# Patient Record
Sex: FEMALE | Race: WHITE | NOT HISPANIC OR LATINO | Employment: UNEMPLOYED | URBAN - NONMETROPOLITAN AREA
[De-identification: names, ages, dates, MRNs, and addresses within clinical notes are randomized per-mention and may not be internally consistent; named-entity substitution may affect disease eponyms.]

---

## 2017-01-06 ENCOUNTER — TRANSCRIBE ORDERS (OUTPATIENT)
Dept: ADMINISTRATIVE | Facility: HOSPITAL | Age: 42
End: 2017-01-06

## 2017-01-06 DIAGNOSIS — M25.571 CHRONIC PAIN OF RIGHT ANKLE: Primary | ICD-10-CM

## 2017-01-06 DIAGNOSIS — G89.29 CHRONIC PAIN OF RIGHT ANKLE: Primary | ICD-10-CM

## 2017-01-12 ENCOUNTER — HOSPITAL ENCOUNTER (OUTPATIENT)
Dept: CT IMAGING | Facility: HOSPITAL | Age: 42
Discharge: HOME | End: 2017-01-12
Attending: PODIATRIST | Admitting: PODIATRIST

## 2017-01-12 DIAGNOSIS — G89.29 CHRONIC PAIN OF RIGHT ANKLE: ICD-10-CM

## 2017-01-12 DIAGNOSIS — M25.571 CHRONIC PAIN OF RIGHT ANKLE: ICD-10-CM

## 2017-01-12 PROCEDURE — 73700 CT LOWER EXTREMITY W/O DYE: CPT

## 2017-01-25 ENCOUNTER — APPOINTMENT (OUTPATIENT)
Dept: PREADMISSION TESTING | Facility: HOSPITAL | Age: 42
End: 2017-01-25

## 2017-01-25 VITALS
SYSTOLIC BLOOD PRESSURE: 127 MMHG | WEIGHT: 262.25 LBS | HEART RATE: 85 BPM | HEIGHT: 69 IN | BODY MASS INDEX: 38.84 KG/M2 | DIASTOLIC BLOOD PRESSURE: 82 MMHG | OXYGEN SATURATION: 97 %

## 2017-01-25 LAB
25(OH)D3 SERPL-MCNC: 22 NG/ML (ref 30–100)
CALCIUM SPEC-SCNC: 9.4 MG/DL (ref 8.4–10.4)
DEPRECATED RDW RBC AUTO: 47.7 FL (ref 40–54)
ERYTHROCYTE [DISTWIDTH] IN BLOOD BY AUTOMATED COUNT: 14.8 % (ref 12–15)
HCT VFR BLD AUTO: 39.2 % (ref 37–47)
HGB BLD-MCNC: 13 G/DL (ref 12–16)
MCH RBC QN AUTO: 29.5 PG (ref 28–32)
MCHC RBC AUTO-ENTMCNC: 33.2 G/DL (ref 33–36)
MCV RBC AUTO: 88.9 FL (ref 82–98)
PLATELET # BLD AUTO: 308 10*3/MM3 (ref 130–400)
PMV BLD AUTO: 10.7 FL (ref 6–12)
RBC # BLD AUTO: 4.41 10*6/MM3 (ref 4.2–5.4)
WBC NRBC COR # BLD: 8.83 10*3/MM3 (ref 4.8–10.8)

## 2017-01-25 PROCEDURE — 85027 COMPLETE CBC AUTOMATED: CPT | Performed by: PODIATRIST

## 2017-01-25 PROCEDURE — 36415 COLL VENOUS BLD VENIPUNCTURE: CPT

## 2017-01-25 PROCEDURE — 82310 ASSAY OF CALCIUM: CPT | Performed by: PODIATRIST

## 2017-01-25 PROCEDURE — 82306 VITAMIN D 25 HYDROXY: CPT | Performed by: PODIATRIST

## 2017-01-25 RX ORDER — ROPINIROLE 2 MG/1
2 TABLET, FILM COATED ORAL DAILY PRN
COMMUNITY

## 2017-01-25 NOTE — MR AVS SNAPSHOT
"                        Vikash Sargent   2017 10:00 AM   Appointment    Provider:  [facility] 34 RM 4   Department:  [facility] PREADMISSION T   Dept Phone:  [phone]                Your Full Care Plan              Your Updated Medication List          This list is accurate as of: 17 10:46 AM.  Always use your most recent med list.                rOPINIRole 2 MG tablet   Commonly known as:  REQUIP               IntegenX Signup     [facility] IntegenX allows you to send messages to your doctor, view your test results, renew your prescriptions, schedule appointments, and more. To sign up, go to [employer]FO and click on the Sign Up Now link in the New User? box. Enter your Inte[employer] Activation Code exactly as it appears below along with the last four digits of your Social Security Number and your Date of Birth () to complete the sign-up process. If you do not sign up before the expiration date, you must request a new code.    IntegenX Activation Code: [email or web address]  Expires: 2017 10:46 AM    If you have questions, you can email [email or web address] or call [phone] to talk to our Integen[employer] staff. Remember, IntegenX is NOT to be used for urgent needs. For medical emergencies, dial 911.               Other Info from Your Visit           Allergies     Fish-derived Products Shortness Of Breath      Vital Signs     Blood Pressure Pulse Height Weight Oxygen Saturation Body Mass Index    127/82 85 69\" (175.3 cm) 262 lb 4 oz (119 kg) 97% 38.73 kg/m2    Smoking Status                   Current Every Day Smoker             Discharge Instructions         DAY OF SURGERY INSTRUCTIONS        YOUR SURGEON: ***Dr. Wang    PROCEDURE: ***ankle and subtalor joint arthrodesis, right ankle    DATE OF SURGERY: ***17    ARRIVAL TIME: AS DIRECTED BY OFFICE    DAY OF SURGERY TAKE ONLY THESE MEDICATIONS: ***none        BEFORE YOU COME TO THE HOSPITAL  (Pre-op instructions)  • Do " not eat, drink, smoke or chew gum after midnight the night before surgery.  This also includes no mints.  • Morning of surgery take only the medicines you have been instructed with a sip of water unless otherwise instructed  by your physician.  • Do not shave, wear makeup or dark nail polish.  • Remove all jewelry including rings.  • Leave anything you consider valuable at home.  • Leave your suitcase in the car until after your surgery.  • Bring the following with you if applicable:  o Picture ID and insurance, Medicare or Medicaid cards  o Co-pay/deductible required by insurance (cash, check, credit card)  o Medications (no narcotics) in original bottles (not a list) including all over-the-counter medications.  o Copy of advance directive, living will or power-of- documents if not brought to PAT  o CPAP or BIPAP mask and tubing  o Skin prep instruction sheet  o Relaxation aids (MP3 player, book, magazine)  • Confirm your arrival time with you surgeon the day before your surgery (surgery times are subject to change)  • On the day of surgery check in at registration located at the main entrance of the hospital.       Outpatient Surgery Guidelines, Adult  Outpatient procedures are those for which the person having the procedure is allowed to go home the same day as the procedure. Various procedures are done on an outpatient basis. You should follow some general guidelines if you will be having an outpatient procedure.  LET YOUR HEALTH CARE PROVIDER KNOW ABOUT:  · Any allergies you have.  · All medicines you are taking, including vitamins, herbs, eye drops, creams, and over-the-counter medicines.  · Previous problems you or members of your family have had with the use of anesthetics.  · Any blood disorders you have.  · Previous surgeries you have had.  · Medical conditions you have.  RISKS AND COMPLICATIONS  Your health care provider will discuss possible risks and complications with you before surgery. Common  risks and complications include:    · Problems due to the use of anesthetics.  · Blood loss and replacement (does not apply to minor surgical procedures).  · Temporary increase in pain due to surgery.  · Uncorrected pain or problems that the surgery was meant to correct.  · Infection.  · New damage.  BEFORE THE PROCEDURE  · Ask your health care provider about changing or stopping your regular medicines. You may need to stop taking certain medicines in the days or weeks before the procedure.  · Stop smoking at least 2 weeks before surgery. This lowers your risk for complications during and after surgery. Ask your health care provider for help with this if needed.  · Eat your usual meals and a light supper the day before surgery. Continue fluid intake. Do not drink alcohol.  · Do not eat or drink after midnight the night before your surgery.   · Arrange for someone to take you home and to stay with you for 24 hours after the procedure. Medicine given for your procedure may affect your ability to drive or to care for yourself.  · Call your health care provider's office if you develop an illness or problem that may prevent you from safely having your procedure.  AFTER THE PROCEDURE  After surgery, you will be taken to a recovery area, where your progress will be monitored. If there are no complications, you will be allowed to go home when you are awake, stable, and taking fluids well. You may have numbness around the surgical site. Healing will take some time. You will have tenderness at the surgical site and may have some swelling and bruising. You may also have some nausea.  HOME CARE INSTRUCTIONS  · Do not drive for 24 hours, or as directed by your health care provider. Do not drive while taking prescription pain medicines.  · Do not drink alcohol for 24 hours.  · Do not make important decisions or sign legal documents for 24 hours.  · You may resume a normal diet and activities as directed.  · Do not lift anything  heavier than 10 pounds (4.5 kg) or play contact sports until your health care provider says it is okay.  · Change your bandages (dressings) as directed.  · Only take over-the-counter or prescription medicines as directed by your health care provider.  · Follow up with your health care provider as directed.  SEEK MEDICAL CARE IF:  · You have increased bleeding (more than a small spot) from the surgical site.  · You have redness, swelling, or increasing pain in the wound.  · You see pus coming from the wound.  · You have a fever.  · You notice a bad smell coming from the wound or dressing.  · You feel lightheaded or faint.  · You develop a rash.  · You have trouble breathing.  · You develop allergies.  MAKE SURE YOU:  · Understand these instructions.  · Will watch your condition.  · Will get help right away if you are not doing well or get worse.     This information is not intended to replace advice given to you by your health care provider. Make sure you discuss any questions you have with your health care provider.     Document Released: 09/12/2002 Document Revised: 05/03/2016 Document Reviewed: 05/22/2014  [employer] Interactive Patient Education ©2016 [employer]       Fall Prevention in Hospitals, Adult  As a hospital patient, your condition and the treatments you receive can increase your risk for falls. Some additional risk factors for falls in a hospital include:  · Being in an unfamiliar environment.  · Being on bed rest.  · Your surgery.  · Taking certain medicines.  · Your tubing requirements, such as intravenous (IV) therapy or catheters.  It is important that you learn how to decrease fall risks while at the hospital. Below are important tips that can help prevent falls.  SAFETY TIPS FOR PREVENTING FALLS  Talk about your risk of falling.  · Ask your health care provider why you are at risk for falling. Is it your medicine, illness, tubing placement, or something else?  · Make a plan with your health care  provider to keep you safe from falls.  · Ask your health care provider or pharmacist about side effects of your medicines. Some medicines can make you dizzy or affect your coordination.  Ask for help.  · Ask for help before getting out of bed. You may need to press your call button.  · Ask for assistance in getting safely to the toilet.  · Ask for a walker or cane to be put at your bedside. Ask that most of the side rails on your bed be placed up before your health care provider leaves the room.  · Ask family or friends to sit with you.  · Ask for things that are out of your reach, such as your glasses, hearing aids, telephone, bedside table, or call button.  Follow these tips to avoid falling:  · Stay lying or seated, rather than standing, while waiting for help.  · Wear rubber-soled slippers or shoes whenever you walk in the hospital.  · Avoid quick, sudden movements.  ¨ Change positions slowly.  ¨ Sit on the side of your bed before standing.  ¨ Stand up slowly and wait before you start to walk.  · Let your health care provider know if there is a spill on the floor.  · Pay careful attention to the medical equipment, electrical cords, and tubes around you.  · When you need help, use your call button by your bed or in the bathroom. Wait for one of your health care providers to help you.  · If you feel dizzy or unsure of your footing, return to bed and wait for assistance.  · Avoid being distracted by the TV, telephone, or another person in your room.  · Do not lean or support yourself on rolling objects, such as IV poles or bedside tables.     This information is not intended to replace advice given to you by your health care provider. Make sure you discuss any questions you have with your health care provider.     Document Released: 12/15/2001 Document Revised: 01/08/2016 Document Reviewed: 08/25/2013  [employer] Interactive Patient Education ©2016 [employer].       Surgical Site Infections FAQs  What is a Surgical  Site Infection (SSI)?  A surgical site infection is an infection that occurs after surgery in the part of the body where the surgery took place. Most patients who have surgery do not develop an infection. However, infections develop in about 1 to 3 out of every 100 patients who have surgery.  Some of the common symptoms of a surgical site infection are:  · Redness and pain around the area where you had surgery  · Drainage of cloudy fluid from your surgical wound  · Fever  Can SSIs be treated?  Yes. Most surgical site infections can be treated with antibiotics. The antibiotic given to you depends on the bacteria (germs) causing the infection. Sometimes patients with SSIs also need another surgery to treat the infection.  What are some of the things that hospitals are doing to prevent SSIs?  To prevent SSIs, doctors, nurses, and other healthcare providers:  · Clean their hands and arms up to their elbows with an antiseptic agent just before the surgery.  · Clean their hands with soap and water or an alcohol-based hand rub before and after caring for each patient.  · May remove some of your hair immediately before your surgery using electric clippers if the hair is in the same area where the procedure will occur. They should not shave you with a razor.  · Wear special hair covers, masks, gowns, and gloves during surgery to keep the surgery area clean.  · Give you antibiotics before your surgery starts. In most cases, you should get antibiotics within 60 minutes before the surgery starts and the antibiotics should be stopped within 24 hours after surgery.  · Clean the skin at the site of your surgery with a special soap that kills germs.  What can I do to help prevent SSIs?  Before your surgery:  · Tell your doctor about other medical problems you may have. Health problems such as allergies, diabetes, and obesity could affect your surgery and your treatment.  · Quit smoking. Patients who smoke get more infections. Talk  to your doctor about how you can quit before your surgery.  · Do not shave near where you will have surgery. Shaving with a razor can irritate your skin and make it easier to develop an infection.  At the time of your surgery:  · Speak up if someone tries to shave you with a razor before surgery. Ask why you need to be shaved and talk with your surgeon if you have any concerns.  · Ask if you will get antibiotics before surgery.  After your surgery:  · Make sure that your healthcare providers clean their hands before examining you, either with soap and water or an alcohol-based hand rub.  · If you do not see your providers clean their hands, please ask them to do so.  · Family and friends who visit you should not touch the surgical wound or dressings.  · Family and friends should clean their hands with soap and water or an alcohol-based hand rub before and after visiting you. If you do not see them clean their hands, ask them to clean their hands.  What do I need to do when I go home from the hospital?  · Before you go home, your doctor or nurse should explain everything you need to know about taking care of your wound. Make sure you understand how to care for your wound before you leave the hospital.  · Always clean your hands before and after caring for your wound.  · Before you go home, make sure you know who to contact if you have questions or problems after you get home.  · If you have any symptoms of an infection, such as redness and pain at the surgery site, drainage, or fever, call your doctor immediately.  If you have additional questions, please ask your doctor or nurse.  Developed and co-sponsored by The Society for Healthcare Epidemiology of [place] (SHEA); Infectious Diseases Society of [place] (IDSA); American Hospital Association; Association for Professionals in Infection Control and Epidemiology (APIC); Centers for Disease Control and Prevention (CDC); and The Joint Commission.     This information  is not intended to replace advice given to you by your health care provider. Make sure you discuss any questions you have with your health care provider.     Document Released: 12/23/2014 Document Revised: 01/08/2016 Document Reviewed: 03/02/2016  [employer] Interactive Patient Education ©2016 [employer] Inc.       [facility]  CHG 4% Patient Instruction Sheet    Preparing the Skin Before Surgery  Preparing or “prepping” skin before surgery can reduce the risk of infection at the surgical site. To make the process easier,[facility] has chosen 4% Chlorhexidine Gluconate (CHG) antiseptic solution.   The steps below outline the prepping process and should be carefully followed.                                                                                                                                                      Prep the skin at the following time(s):                                                      We recommend you shower the night before surgery, and again the morning of surgery with the 4% CHG antiseptic solution using half of the bottle and a cloth each time.  Dress in clean clothes/sleepwear after showering.  See instructions below for application.          Do not apply any lotions or moisturizers.       Do not shave the area to be prepped for at least 2 days prior to surgery.    Clipping the hair may be done immediately prior to your surgery at the hospital    if needed.    Directions:  Thoroughly rinse your body with water.  Apply 4% CHG to a cloth and wash skin gently, paying special attention to the operative site.  Rinse again thoroughly.  Once you have begun using this product do not apply anything else to your skin. If itching or redness persists, rinse affected areas and discontinue use.    When using this product:  • Keep out of eyes, ears, and mouth.  • If solution should contact these areas, rinse out promptly and thoroughly with water.  • For external use only.  • Do not use in genital  area, on your face or head.      PATIENT/FAMILY/RESPONSIBLE PARTY VERBALIZES UNDERSTANDING OF ABOVE EDUCATION       SYMPTOMS OF A STROKE    Call 911 or have someone take you to the Emergency Department if you have any of the following:    · Sudden numbness or weakness of your face, arm or leg especially on one side of the body  · Sudden confusion, diffiiculty speaking or trouble understanding   · Changes in your vision or loss of sight in one eye  · Sudden severe headache with no known cause  · sudden dizziness, trouble walking, loss of balance or coordination    It is important to seek emergency care right away if you have further stroke symptoms. If you get emergency help quickly, the powerful clot-dissolving medicines can reduce the disabilities caused by a stroke.     For more information:    American Stroke Association  4-873-6-STROKE  www.strokeassociation.org           IF YOU SMOKE OR USE TOBACCO PLEASE READ THE FOLLOWING:    Why is smoking bad for me?  Smoking increases the risk of heart disease, lung disease, vascular disease, stroke, and cancer.     If you smoke, STOP!    If you would like more information on quitting smoking, please visit the [employer] website: www.clinovo/Purplleate/healthier-together/smoke   This link will provide additional resources including the QUIT line and the [employer] support groups.     For more information:    American Cancer Society  ([phone] 852-2967    American Heart Association  1-313.405.2836

## 2017-01-25 NOTE — DISCHARGE INSTRUCTIONS
DAY OF SURGERY INSTRUCTIONS        YOUR SURGEON: ***Dr. Wang    PROCEDURE: ***ankle and subtalor joint arthrodesis, right ankle    DATE OF SURGERY: ***1/31/17    ARRIVAL TIME: AS DIRECTED BY OFFICE    DAY OF SURGERY TAKE ONLY THESE MEDICATIONS: ***none        BEFORE YOU COME TO THE HOSPITAL  (Pre-op instructions)  • Do not eat, drink, smoke or chew gum after midnight the night before surgery.  This also includes no mints.  • Morning of surgery take only the medicines you have been instructed with a sip of water unless otherwise instructed  by your physician.  • Do not shave, wear makeup or dark nail polish.  • Remove all jewelry including rings.  • Leave anything you consider valuable at home.  • Leave your suitcase in the car until after your surgery.  • Bring the following with you if applicable:  o Picture ID and insurance, Medicare or Medicaid cards  o Co-pay/deductible required by insurance (cash, check, credit card)  o Medications (no narcotics) in original bottles (not a list) including all over-the-counter medications.  o Copy of advance directive, living will or power-of- documents if not brought to PAT  o CPAP or BIPAP mask and tubing  o Skin prep instruction sheet  o Relaxation aids (MP3 player, book, magazine)  • Confirm your arrival time with you surgeon the day before your surgery (surgery times are subject to change)  • On the day of surgery check in at registration located at the main entrance of the hospital.       Outpatient Surgery Guidelines, Adult  Outpatient procedures are those for which the person having the procedure is allowed to go home the same day as the procedure. Various procedures are done on an outpatient basis. You should follow some general guidelines if you will be having an outpatient procedure.  LET YOUR HEALTH CARE PROVIDER KNOW ABOUT:  · Any allergies you have.  · All medicines you are taking, including vitamins, herbs, eye drops, creams, and over-the-counter  medicines.  · Previous problems you or members of your family have had with the use of anesthetics.  · Any blood disorders you have.  · Previous surgeries you have had.  · Medical conditions you have.  RISKS AND COMPLICATIONS  Your health care provider will discuss possible risks and complications with you before surgery. Common risks and complications include:    · Problems due to the use of anesthetics.  · Blood loss and replacement (does not apply to minor surgical procedures).  · Temporary increase in pain due to surgery.  · Uncorrected pain or problems that the surgery was meant to correct.  · Infection.  · New damage.  BEFORE THE PROCEDURE  · Ask your health care provider about changing or stopping your regular medicines. You may need to stop taking certain medicines in the days or weeks before the procedure.  · Stop smoking at least 2 weeks before surgery. This lowers your risk for complications during and after surgery. Ask your health care provider for help with this if needed.  · Eat your usual meals and a light supper the day before surgery. Continue fluid intake. Do not drink alcohol.  · Do not eat or drink after midnight the night before your surgery.   · Arrange for someone to take you home and to stay with you for 24 hours after the procedure. Medicine given for your procedure may affect your ability to drive or to care for yourself.  · Call your health care provider's office if you develop an illness or problem that may prevent you from safely having your procedure.  AFTER THE PROCEDURE  After surgery, you will be taken to a recovery area, where your progress will be monitored. If there are no complications, you will be allowed to go home when you are awake, stable, and taking fluids well. You may have numbness around the surgical site. Healing will take some time. You will have tenderness at the surgical site and may have some swelling and bruising. You may also have some nausea.  HOME CARE  INSTRUCTIONS  · Do not drive for 24 hours, or as directed by your health care provider. Do not drive while taking prescription pain medicines.  · Do not drink alcohol for 24 hours.  · Do not make important decisions or sign legal documents for 24 hours.  · You may resume a normal diet and activities as directed.  · Do not lift anything heavier than 10 pounds (4.5 kg) or play contact sports until your health care provider says it is okay.  · Change your bandages (dressings) as directed.  · Only take over-the-counter or prescription medicines as directed by your health care provider.  · Follow up with your health care provider as directed.  SEEK MEDICAL CARE IF:  · You have increased bleeding (more than a small spot) from the surgical site.  · You have redness, swelling, or increasing pain in the wound.  · You see pus coming from the wound.  · You have a fever.  · You notice a bad smell coming from the wound or dressing.  · You feel lightheaded or faint.  · You develop a rash.  · You have trouble breathing.  · You develop allergies.  MAKE SURE YOU:  · Understand these instructions.  · Will watch your condition.  · Will get help right away if you are not doing well or get worse.     This information is not intended to replace advice given to you by your health care provider. Make sure you discuss any questions you have with your health care provider.     Document Released: 09/12/2002 Document Revised: 05/03/2016 Document Reviewed: 05/22/2014  [employer] Interactive Patient Education ©2016 [employer]       Fall Prevention in Hospitals, Adult  As a hospital patient, your condition and the treatments you receive can increase your risk for falls. Some additional risk factors for falls in a hospital include:  · Being in an unfamiliar environment.  · Being on bed rest.  · Your surgery.  · Taking certain medicines.  · Your tubing requirements, such as intravenous (IV) therapy or catheters.  It is important that you learn how  to decrease fall risks while at the hospital. Below are important tips that can help prevent falls.  SAFETY TIPS FOR PREVENTING FALLS  Talk about your risk of falling.  · Ask your health care provider why you are at risk for falling. Is it your medicine, illness, tubing placement, or something else?  · Make a plan with your health care provider to keep you safe from falls.  · Ask your health care provider or pharmacist about side effects of your medicines. Some medicines can make you dizzy or affect your coordination.  Ask for help.  · Ask for help before getting out of bed. You may need to press your call button.  · Ask for assistance in getting safely to the toilet.  · Ask for a walker or cane to be put at your bedside. Ask that most of the side rails on your bed be placed up before your health care provider leaves the room.  · Ask family or friends to sit with you.  · Ask for things that are out of your reach, such as your glasses, hearing aids, telephone, bedside table, or call button.  Follow these tips to avoid falling:  · Stay lying or seated, rather than standing, while waiting for help.  · Wear rubber-soled slippers or shoes whenever you walk in the hospital.  · Avoid quick, sudden movements.  ¨ Change positions slowly.  ¨ Sit on the side of your bed before standing.  ¨ Stand up slowly and wait before you start to walk.  · Let your health care provider know if there is a spill on the floor.  · Pay careful attention to the medical equipment, electrical cords, and tubes around you.  · When you need help, use your call button by your bed or in the bathroom. Wait for one of your health care providers to help you.  · If you feel dizzy or unsure of your footing, return to bed and wait for assistance.  · Avoid being distracted by the TV, telephone, or another person in your room.  · Do not lean or support yourself on rolling objects, such as IV poles or bedside tables.     This information is not intended to  replace advice given to you by your health care provider. Make sure you discuss any questions you have with your health care provider.     Document Released: 12/15/2001 Document Revised: 01/08/2016 Document Reviewed: 08/25/2013  [employer] Interactive Patient Education ©2016 [employer]       Surgical Site Infections FAQs  What is a Surgical Site Infection (SSI)?  A surgical site infection is an infection that occurs after surgery in the part of the body where the surgery took place. Most patients who have surgery do not develop an infection. However, infections develop in about 1 to 3 out of every 100 patients who have surgery.  Some of the common symptoms of a surgical site infection are:  · Redness and pain around the area where you had surgery  · Drainage of cloudy fluid from your surgical wound  · Fever  Can SSIs be treated?  Yes. Most surgical site infections can be treated with antibiotics. The antibiotic given to you depends on the bacteria (germs) causing the infection. Sometimes patients with SSIs also need another surgery to treat the infection.  What are some of the things that hospitals are doing to prevent SSIs?  To prevent SSIs, doctors, nurses, and other healthcare providers:  · Clean their hands and arms up to their elbows with an antiseptic agent just before the surgery.  · Clean their hands with soap and water or an alcohol-based hand rub before and after caring for each patient.  · May remove some of your hair immediately before your surgery using electric clippers if the hair is in the same area where the procedure will occur. They should not shave you with a razor.  · Wear special hair covers, masks, gowns, and gloves during surgery to keep the surgery area clean.  · Give you antibiotics before your surgery starts. In most cases, you should get antibiotics within 60 minutes before the surgery starts and the antibiotics should be stopped within 24 hours after surgery.  · Clean the skin at the  site of your surgery with a special soap that kills germs.  What can I do to help prevent SSIs?  Before your surgery:  · Tell your doctor about other medical problems you may have. Health problems such as allergies, diabetes, and obesity could affect your surgery and your treatment.  · Quit smoking. Patients who smoke get more infections. Talk to your doctor about how you can quit before your surgery.  · Do not shave near where you will have surgery. Shaving with a razor can irritate your skin and make it easier to develop an infection.  At the time of your surgery:  · Speak up if someone tries to shave you with a razor before surgery. Ask why you need to be shaved and talk with your surgeon if you have any concerns.  · Ask if you will get antibiotics before surgery.  After your surgery:  · Make sure that your healthcare providers clean their hands before examining you, either with soap and water or an alcohol-based hand rub.  · If you do not see your providers clean their hands, please ask them to do so.  · Family and friends who visit you should not touch the surgical wound or dressings.  · Family and friends should clean their hands with soap and water or an alcohol-based hand rub before and after visiting you. If you do not see them clean their hands, ask them to clean their hands.  What do I need to do when I go home from the hospital?  · Before you go home, your doctor or nurse should explain everything you need to know about taking care of your wound. Make sure you understand how to care for your wound before you leave the hospital.  · Always clean your hands before and after caring for your wound.  · Before you go home, make sure you know who to contact if you have questions or problems after you get home.  · If you have any symptoms of an infection, such as redness and pain at the surgery site, drainage, or fever, call your doctor immediately.  If you have additional questions, please ask your doctor or  nurse.  Developed and co-sponsored by The Society for Healthcare Epidemiology of [place] (SHEA); Infectious Diseases Society of [place] (IDSA); American Hospital Association; Association for Professionals in Infection Control and Epidemiology (APIC); Centers for Disease Control and Prevention (CDC); and The Joint Commission.     This information is not intended to replace advice given to you by your health care provider. Make sure you discuss any questions you have with your health care provider.     Document Released: 12/23/2014 Document Revised: 01/08/2016 Document Reviewed: 03/02/2016  [employer] Interactive Patient Education ©2016 [employer]       [facility]  CHG 4% Patient Instruction Sheet    Preparing the Skin Before Surgery  Preparing or “prepping” skin before surgery can reduce the risk of infection at the surgical site. To make the process easier,[facility] has chosen 4% Chlorhexidine Gluconate (CHG) antiseptic solution.   The steps below outline the prepping process and should be carefully followed.                                                                                                                                                      Prep the skin at the following time(s):                                                      We recommend you shower the night before surgery, and again the morning of surgery with the 4% CHG antiseptic solution using half of the bottle and a cloth each time.  Dress in clean clothes/sleepwear after showering.  See instructions below for application.          Do not apply any lotions or moisturizers.       Do not shave the area to be prepped for at least 2 days prior to surgery.    Clipping the hair may be done immediately prior to your surgery at the hospital    if needed.    Directions:  Thoroughly rinse your body with water.  Apply 4% CHG to a cloth and wash skin gently, paying special attention to the operative site.  Rinse again thoroughly.  Once you  have begun using this product do not apply anything else to your skin. If itching or redness persists, rinse affected areas and discontinue use.    When using this product:  • Keep out of eyes, ears, and mouth.  • If solution should contact these areas, rinse out promptly and thoroughly with water.  • For external use only.  • Do not use in genital area, on your face or head.      PATIENT/FAMILY/RESPONSIBLE PARTY VERBALIZES UNDERSTANDING OF ABOVE EDUCATION

## 2017-01-31 ENCOUNTER — HOSPITAL ENCOUNTER (INPATIENT)
Facility: HOSPITAL | Age: 42
LOS: 3 days | Discharge: HOME | End: 2017-02-03
Attending: PODIATRIST | Admitting: PODIATRIST

## 2017-01-31 ENCOUNTER — ANESTHESIA (OUTPATIENT)
Dept: PERIOP | Facility: HOSPITAL | Age: 42
End: 2017-01-31

## 2017-01-31 ENCOUNTER — APPOINTMENT (OUTPATIENT)
Dept: GENERAL RADIOLOGY | Facility: HOSPITAL | Age: 42
End: 2017-01-31

## 2017-01-31 ENCOUNTER — ANESTHESIA EVENT (OUTPATIENT)
Dept: PERIOP | Facility: HOSPITAL | Age: 42
End: 2017-01-31

## 2017-01-31 DIAGNOSIS — Z74.09 IMPAIRED FUNCTIONAL MOBILITY, BALANCE, GAIT, AND ENDURANCE: ICD-10-CM

## 2017-01-31 PROBLEM — M19.179 POST-TRAUMATIC ARTHRITIS OF ANKLE: Status: ACTIVE | Noted: 2017-01-31

## 2017-01-31 LAB — B-HCG UR QL: NEGATIVE

## 2017-01-31 PROCEDURE — 25010000002 HYDROMORPHONE PER 4 MG: Performed by: PODIATRIST

## 2017-01-31 PROCEDURE — 94799 UNLISTED PULMONARY SVC/PX: CPT

## 2017-01-31 PROCEDURE — 25010000002 FENTANYL CITRATE (PF) 100 MCG/2ML SOLUTION: Performed by: NURSE ANESTHETIST, CERTIFIED REGISTERED

## 2017-01-31 PROCEDURE — C1713 ANCHOR/SCREW BN/BN,TIS/BN: HCPCS | Performed by: PODIATRIST

## 2017-01-31 PROCEDURE — 25010000002 ONDANSETRON PER 1 MG: Performed by: PODIATRIST

## 2017-01-31 PROCEDURE — 25010000002 PROPOFOL 10 MG/ML EMULSION: Performed by: NURSE ANESTHETIST, CERTIFIED REGISTERED

## 2017-01-31 PROCEDURE — 73600 X-RAY EXAM OF ANKLE: CPT

## 2017-01-31 PROCEDURE — 25010000002 ROPIVACAINE PER 1 MG: Performed by: PODIATRIST

## 2017-01-31 PROCEDURE — 25010000002 ROPIVACAINE PER 1 MG: Performed by: ANESTHESIOLOGY

## 2017-01-31 PROCEDURE — 25010000002 METOCLOPRAMIDE PER 10 MG: Performed by: ANESTHESIOLOGY

## 2017-01-31 PROCEDURE — 25010000002 ONDANSETRON PER 1 MG: Performed by: ANESTHESIOLOGY

## 2017-01-31 PROCEDURE — 25010000002 MIDAZOLAM PER 1 MG: Performed by: ANESTHESIOLOGY

## 2017-01-31 PROCEDURE — 0SGH04Z FUSION OF RIGHT TARSAL JOINT WITH INTERNAL FIXATION DEVICE, OPEN APPROACH: ICD-10-PCS | Performed by: PODIATRIST

## 2017-01-31 PROCEDURE — 25010000002 SUCCINYLCHOLINE PER 20 MG: Performed by: NURSE ANESTHETIST, CERTIFIED REGISTERED

## 2017-01-31 PROCEDURE — 76000 FLUOROSCOPY <1 HR PHYS/QHP: CPT

## 2017-01-31 PROCEDURE — 25010000002 HYDROMORPHONE PER 4 MG: Performed by: ANESTHESIOLOGY

## 2017-01-31 PROCEDURE — 81025 URINE PREGNANCY TEST: CPT | Performed by: ANESTHESIOLOGY

## 2017-01-31 PROCEDURE — 25010000002 HYDROMORPHONE PER 4 MG: Performed by: NURSE ANESTHETIST, CERTIFIED REGISTERED

## 2017-01-31 PROCEDURE — 25010000003 CEFAZOLIN PER 500 MG: Performed by: NURSE ANESTHETIST, CERTIFIED REGISTERED

## 2017-01-31 DEVICE — IMPLANTABLE DEVICE
Type: IMPLANTABLE DEVICE | Site: ANKLE | Status: FUNCTIONAL
Brand: VALOR

## 2017-01-31 DEVICE — IMPLANTABLE DEVICE
Type: IMPLANTABLE DEVICE | Status: FUNCTIONAL
Brand: VALOR®

## 2017-01-31 DEVICE — BONE GRAFT KIT
Type: IMPLANTABLE DEVICE | Status: FUNCTIONAL
Brand: AUGMENT BONE GRAFT

## 2017-01-31 DEVICE — IMPLANTABLE DEVICE
Type: IMPLANTABLE DEVICE | Status: FUNCTIONAL
Brand: VALOR

## 2017-01-31 RX ORDER — PROMETHAZINE HYDROCHLORIDE 25 MG/ML
12.5 INJECTION, SOLUTION INTRAMUSCULAR; INTRAVENOUS EVERY 4 HOURS PRN
Status: DISCONTINUED | OUTPATIENT
Start: 2017-01-31 | End: 2017-02-03 | Stop reason: HOSPADM

## 2017-01-31 RX ORDER — SODIUM CHLORIDE, SODIUM LACTATE, POTASSIUM CHLORIDE, CALCIUM CHLORIDE 600; 310; 30; 20 MG/100ML; MG/100ML; MG/100ML; MG/100ML
100 INJECTION, SOLUTION INTRAVENOUS CONTINUOUS
Status: DISCONTINUED | OUTPATIENT
Start: 2017-01-31 | End: 2017-01-31 | Stop reason: HOSPADM

## 2017-01-31 RX ORDER — METOCLOPRAMIDE HYDROCHLORIDE 5 MG/ML
10 INJECTION INTRAMUSCULAR; INTRAVENOUS ONCE AS NEEDED
Status: COMPLETED | OUTPATIENT
Start: 2017-01-31 | End: 2017-01-31

## 2017-01-31 RX ORDER — FLUMAZENIL 0.1 MG/ML
0.2 INJECTION INTRAVENOUS AS NEEDED
Status: DISCONTINUED | OUTPATIENT
Start: 2017-01-31 | End: 2017-01-31 | Stop reason: HOSPADM

## 2017-01-31 RX ORDER — ROPINIROLE 1 MG/1
2 TABLET, FILM COATED ORAL DAILY PRN
Status: DISCONTINUED | OUTPATIENT
Start: 2017-01-31 | End: 2017-02-03 | Stop reason: HOSPADM

## 2017-01-31 RX ORDER — MAGNESIUM HYDROXIDE 1200 MG/15ML
LIQUID ORAL AS NEEDED
Status: DISCONTINUED | OUTPATIENT
Start: 2017-01-31 | End: 2017-01-31 | Stop reason: HOSPADM

## 2017-01-31 RX ORDER — ROPIVACAINE HYDROCHLORIDE 5 MG/ML
INJECTION, SOLUTION EPIDURAL; INFILTRATION; PERINEURAL AS NEEDED
Status: DISCONTINUED | OUTPATIENT
Start: 2017-01-31 | End: 2017-01-31 | Stop reason: SURG

## 2017-01-31 RX ORDER — NALOXONE HCL 0.4 MG/ML
0.04 VIAL (ML) INJECTION AS NEEDED
Status: DISCONTINUED | OUTPATIENT
Start: 2017-01-31 | End: 2017-01-31 | Stop reason: HOSPADM

## 2017-01-31 RX ORDER — SODIUM CHLORIDE, SODIUM LACTATE, POTASSIUM CHLORIDE, CALCIUM CHLORIDE 600; 310; 30; 20 MG/100ML; MG/100ML; MG/100ML; MG/100ML
50 INJECTION, SOLUTION INTRAVENOUS CONTINUOUS
Status: DISCONTINUED | OUTPATIENT
Start: 2017-01-31 | End: 2017-02-03 | Stop reason: HOSPADM

## 2017-01-31 RX ORDER — FAMOTIDINE 10 MG/ML
20 INJECTION, SOLUTION INTRAVENOUS ONCE
Status: COMPLETED | OUTPATIENT
Start: 2017-01-31 | End: 2017-01-31

## 2017-01-31 RX ORDER — LABETALOL HYDROCHLORIDE 5 MG/ML
5 INJECTION, SOLUTION INTRAVENOUS
Status: DISCONTINUED | OUTPATIENT
Start: 2017-01-31 | End: 2017-01-31 | Stop reason: HOSPADM

## 2017-01-31 RX ORDER — MIDAZOLAM HYDROCHLORIDE 1 MG/ML
2 INJECTION INTRAMUSCULAR; INTRAVENOUS
Status: DISCONTINUED | OUTPATIENT
Start: 2017-01-31 | End: 2017-01-31 | Stop reason: HOSPADM

## 2017-01-31 RX ORDER — MEPERIDINE HYDROCHLORIDE 25 MG/ML
12.5 INJECTION INTRAMUSCULAR; INTRAVENOUS; SUBCUTANEOUS
Status: DISCONTINUED | OUTPATIENT
Start: 2017-01-31 | End: 2017-01-31 | Stop reason: HOSPADM

## 2017-01-31 RX ORDER — MORPHINE SULFATE 2 MG/ML
2 INJECTION, SOLUTION INTRAMUSCULAR; INTRAVENOUS AS NEEDED
Status: DISCONTINUED | OUTPATIENT
Start: 2017-01-31 | End: 2017-01-31 | Stop reason: HOSPADM

## 2017-01-31 RX ORDER — LIDOCAINE HYDROCHLORIDE 20 MG/ML
INJECTION, SOLUTION INFILTRATION; PERINEURAL AS NEEDED
Status: DISCONTINUED | OUTPATIENT
Start: 2017-01-31 | End: 2017-01-31 | Stop reason: SURG

## 2017-01-31 RX ORDER — METOCLOPRAMIDE HYDROCHLORIDE 5 MG/ML
5 INJECTION INTRAMUSCULAR; INTRAVENOUS
Status: DISCONTINUED | OUTPATIENT
Start: 2017-01-31 | End: 2017-01-31 | Stop reason: HOSPADM

## 2017-01-31 RX ORDER — SODIUM CHLORIDE 0.9 % (FLUSH) 0.9 %
1-10 SYRINGE (ML) INJECTION AS NEEDED
Status: DISCONTINUED | OUTPATIENT
Start: 2017-01-31 | End: 2017-01-31 | Stop reason: HOSPADM

## 2017-01-31 RX ORDER — ONDANSETRON 2 MG/ML
4 INJECTION INTRAMUSCULAR; INTRAVENOUS EVERY 6 HOURS PRN
Status: DISCONTINUED | OUTPATIENT
Start: 2017-01-31 | End: 2017-02-03 | Stop reason: HOSPADM

## 2017-01-31 RX ORDER — HYDRALAZINE HYDROCHLORIDE 20 MG/ML
5 INJECTION INTRAMUSCULAR; INTRAVENOUS
Status: DISCONTINUED | OUTPATIENT
Start: 2017-01-31 | End: 2017-01-31 | Stop reason: HOSPADM

## 2017-01-31 RX ORDER — CARISOPRODOL 350 MG/1
350 TABLET ORAL EVERY 8 HOURS SCHEDULED
Status: DISCONTINUED | OUTPATIENT
Start: 2017-01-31 | End: 2017-02-03 | Stop reason: HOSPADM

## 2017-01-31 RX ORDER — ACETAMINOPHEN 10 MG/ML
INJECTION, SOLUTION INTRAVENOUS AS NEEDED
Status: DISCONTINUED | OUTPATIENT
Start: 2017-01-31 | End: 2017-01-31 | Stop reason: SURG

## 2017-01-31 RX ORDER — ROPIVACAINE HYDROCHLORIDE 5 MG/ML
INJECTION, SOLUTION EPIDURAL; INFILTRATION; PERINEURAL AS NEEDED
Status: DISCONTINUED | OUTPATIENT
Start: 2017-01-31 | End: 2017-01-31 | Stop reason: HOSPADM

## 2017-01-31 RX ORDER — PROPOFOL 10 MG/ML
VIAL (ML) INTRAVENOUS AS NEEDED
Status: DISCONTINUED | OUTPATIENT
Start: 2017-01-31 | End: 2017-01-31 | Stop reason: SURG

## 2017-01-31 RX ORDER — LIDOCAINE HYDROCHLORIDE 40 MG/ML
SOLUTION TOPICAL AS NEEDED
Status: DISCONTINUED | OUTPATIENT
Start: 2017-01-31 | End: 2017-01-31 | Stop reason: SURG

## 2017-01-31 RX ORDER — ONDANSETRON 2 MG/ML
4 INJECTION INTRAMUSCULAR; INTRAVENOUS AS NEEDED
Status: COMPLETED | OUTPATIENT
Start: 2017-01-31 | End: 2017-01-31

## 2017-01-31 RX ORDER — OXYCODONE AND ACETAMINOPHEN 10; 325 MG/1; MG/1
1 TABLET ORAL EVERY 4 HOURS
Status: DISCONTINUED | OUTPATIENT
Start: 2017-01-31 | End: 2017-02-01

## 2017-01-31 RX ORDER — FENTANYL CITRATE 50 UG/ML
25 INJECTION, SOLUTION INTRAMUSCULAR; INTRAVENOUS AS NEEDED
Status: DISCONTINUED | OUTPATIENT
Start: 2017-01-31 | End: 2017-01-31 | Stop reason: HOSPADM

## 2017-01-31 RX ORDER — CEFAZOLIN SODIUM 1 G/3ML
INJECTION, POWDER, FOR SOLUTION INTRAMUSCULAR; INTRAVENOUS AS NEEDED
Status: DISCONTINUED | OUTPATIENT
Start: 2017-01-31 | End: 2017-01-31 | Stop reason: SURG

## 2017-01-31 RX ORDER — LABETALOL HYDROCHLORIDE 5 MG/ML
INJECTION, SOLUTION INTRAVENOUS AS NEEDED
Status: DISCONTINUED | OUTPATIENT
Start: 2017-01-31 | End: 2017-01-31 | Stop reason: SURG

## 2017-01-31 RX ORDER — HYDROMORPHONE HCL 110MG/55ML
PATIENT CONTROLLED ANALGESIA SYRINGE INTRAVENOUS AS NEEDED
Status: DISCONTINUED | OUTPATIENT
Start: 2017-01-31 | End: 2017-01-31 | Stop reason: SURG

## 2017-01-31 RX ORDER — MIDAZOLAM HYDROCHLORIDE 1 MG/ML
1 INJECTION INTRAMUSCULAR; INTRAVENOUS
Status: DISCONTINUED | OUTPATIENT
Start: 2017-01-31 | End: 2017-01-31 | Stop reason: HOSPADM

## 2017-01-31 RX ORDER — ROCURONIUM BROMIDE 10 MG/ML
INJECTION, SOLUTION INTRAVENOUS AS NEEDED
Status: DISCONTINUED | OUTPATIENT
Start: 2017-01-31 | End: 2017-01-31 | Stop reason: SURG

## 2017-01-31 RX ORDER — IPRATROPIUM BROMIDE AND ALBUTEROL SULFATE 2.5; .5 MG/3ML; MG/3ML
3 SOLUTION RESPIRATORY (INHALATION) ONCE AS NEEDED
Status: DISCONTINUED | OUTPATIENT
Start: 2017-01-31 | End: 2017-01-31 | Stop reason: HOSPADM

## 2017-01-31 RX ORDER — FENTANYL CITRATE 50 UG/ML
INJECTION, SOLUTION INTRAMUSCULAR; INTRAVENOUS AS NEEDED
Status: DISCONTINUED | OUTPATIENT
Start: 2017-01-31 | End: 2017-01-31 | Stop reason: SURG

## 2017-01-31 RX ORDER — NALOXONE HCL 0.4 MG/ML
0.4 VIAL (ML) INJECTION
Status: DISCONTINUED | OUTPATIENT
Start: 2017-01-31 | End: 2017-02-03 | Stop reason: HOSPADM

## 2017-01-31 RX ORDER — SUCCINYLCHOLINE CHLORIDE 20 MG/ML
INJECTION INTRAMUSCULAR; INTRAVENOUS AS NEEDED
Status: DISCONTINUED | OUTPATIENT
Start: 2017-01-31 | End: 2017-01-31 | Stop reason: SURG

## 2017-01-31 RX ADMIN — ONDANSETRON HYDROCHLORIDE 4 MG: 2 SOLUTION INTRAMUSCULAR; INTRAVENOUS at 10:29

## 2017-01-31 RX ADMIN — HYDROMORPHONE HYDROCHLORIDE 1 MG: 1 INJECTION, SOLUTION INTRAMUSCULAR; INTRAVENOUS; SUBCUTANEOUS at 15:02

## 2017-01-31 RX ADMIN — OXYCODONE HYDROCHLORIDE AND ACETAMINOPHEN 1 TABLET: 10; 325 TABLET ORAL at 20:07

## 2017-01-31 RX ADMIN — LABETALOL HYDROCHLORIDE 10 MG: 5 INJECTION, SOLUTION INTRAVENOUS at 08:57

## 2017-01-31 RX ADMIN — FENTANYL CITRATE 100 MCG: 50 INJECTION INTRAMUSCULAR; INTRAVENOUS at 08:38

## 2017-01-31 RX ADMIN — ROPIVACAINE HYDROCHLORIDE 20 ML: 5 INJECTION, SOLUTION EPIDURAL; INFILTRATION; PERINEURAL at 07:00

## 2017-01-31 RX ADMIN — HYDROMORPHONE HYDROCHLORIDE 1 MG: 2 INJECTION, SOLUTION INTRAMUSCULAR; INTRAVENOUS; SUBCUTANEOUS at 09:45

## 2017-01-31 RX ADMIN — OXYCODONE HYDROCHLORIDE AND ACETAMINOPHEN 1 TABLET: 10; 325 TABLET ORAL at 11:33

## 2017-01-31 RX ADMIN — SUCCINYLCHOLINE CHLORIDE 80 MG: 20 INJECTION, SOLUTION INTRAMUSCULAR; INTRAVENOUS at 07:15

## 2017-01-31 RX ADMIN — LIDOCAINE HYDROCHLORIDE 1 EACH: 40 SOLUTION TOPICAL at 07:15

## 2017-01-31 RX ADMIN — SODIUM CHLORIDE, POTASSIUM CHLORIDE, SODIUM LACTATE AND CALCIUM CHLORIDE 100 ML/HR: 600; 310; 30; 20 INJECTION, SOLUTION INTRAVENOUS at 06:54

## 2017-01-31 RX ADMIN — HYDROMORPHONE HYDROCHLORIDE 1 MG: 1 INJECTION, SOLUTION INTRAMUSCULAR; INTRAVENOUS; SUBCUTANEOUS at 10:28

## 2017-01-31 RX ADMIN — ACETAMINOPHEN 1000 MG: 10 INJECTION, SOLUTION INTRAVENOUS at 09:51

## 2017-01-31 RX ADMIN — FENTANYL CITRATE 150 MCG: 50 INJECTION INTRAMUSCULAR; INTRAVENOUS at 07:15

## 2017-01-31 RX ADMIN — MIDAZOLAM HYDROCHLORIDE 2 MG: 1 INJECTION, SOLUTION INTRAMUSCULAR; INTRAVENOUS at 06:54

## 2017-01-31 RX ADMIN — ONDANSETRON 4 MG: 2 INJECTION INTRAMUSCULAR; INTRAVENOUS at 17:49

## 2017-01-31 RX ADMIN — LIDOCAINE HYDROCHLORIDE 0.5 ML: 10 INJECTION, SOLUTION EPIDURAL; INFILTRATION; INTRACAUDAL; PERINEURAL at 06:54

## 2017-01-31 RX ADMIN — PROPOFOL 150 MG: 10 INJECTION, EMULSION INTRAVENOUS at 07:15

## 2017-01-31 RX ADMIN — FENTANYL CITRATE 150 MCG: 50 INJECTION INTRAMUSCULAR; INTRAVENOUS at 08:44

## 2017-01-31 RX ADMIN — LIDOCAINE HYDROCHLORIDE 60 MG: 20 INJECTION, SOLUTION INFILTRATION; PERINEURAL at 07:15

## 2017-01-31 RX ADMIN — FAMOTIDINE 20 MG: 10 INJECTION, SOLUTION INTRAVENOUS at 06:54

## 2017-01-31 RX ADMIN — OXYCODONE HYDROCHLORIDE AND ACETAMINOPHEN 1 TABLET: 10; 325 TABLET ORAL at 16:04

## 2017-01-31 RX ADMIN — LABETALOL HYDROCHLORIDE 10 MG: 5 INJECTION, SOLUTION INTRAVENOUS at 08:55

## 2017-01-31 RX ADMIN — ONDANSETRON HYDROCHLORIDE 4 MG: 2 SOLUTION INTRAMUSCULAR; INTRAVENOUS at 10:00

## 2017-01-31 RX ADMIN — CEFAZOLIN 2 G: 1 INJECTION, POWDER, FOR SOLUTION INTRAVENOUS at 07:14

## 2017-01-31 RX ADMIN — ROCURONIUM BROMIDE 5 MG: 10 INJECTION INTRAVENOUS at 07:15

## 2017-01-31 RX ADMIN — FENTANYL CITRATE 100 MCG: 50 INJECTION INTRAMUSCULAR; INTRAVENOUS at 07:35

## 2017-01-31 RX ADMIN — CARISOPRODOL 350 MG: 350 TABLET ORAL at 13:47

## 2017-01-31 RX ADMIN — METOCLOPRAMIDE 10 MG: 5 INJECTION, SOLUTION INTRAMUSCULAR; INTRAVENOUS at 06:54

## 2017-01-31 RX ADMIN — SODIUM CHLORIDE, POTASSIUM CHLORIDE, SODIUM LACTATE AND CALCIUM CHLORIDE: 600; 310; 30; 20 INJECTION, SOLUTION INTRAVENOUS at 08:55

## 2017-01-31 RX ADMIN — HYDROMORPHONE HYDROCHLORIDE 1 MG: 2 INJECTION, SOLUTION INTRAMUSCULAR; INTRAVENOUS; SUBCUTANEOUS at 09:20

## 2017-01-31 RX ADMIN — CARISOPRODOL 350 MG: 350 TABLET ORAL at 22:08

## 2017-01-31 RX ADMIN — SODIUM CHLORIDE, POTASSIUM CHLORIDE, SODIUM LACTATE AND CALCIUM CHLORIDE 50 ML/HR: 600; 310; 30; 20 INJECTION, SOLUTION INTRAVENOUS at 11:22

## 2017-01-31 RX ADMIN — HYDROMORPHONE HYDROCHLORIDE 1 MG: 1 INJECTION, SOLUTION INTRAMUSCULAR; INTRAVENOUS; SUBCUTANEOUS at 19:06

## 2017-01-31 NOTE — ANESTHESIA PREPROCEDURE EVALUATION
Anesthesia Evaluation     Patient summary reviewed and Nursing notes reviewed    No history of anesthetic complications   Airway   Mallampati: II  TM distance: >3 FB  Neck ROM: full  no difficulty expected  Dental          Pulmonary - normal exam    breath sounds clear to auscultation  (+) hx of smoking,   Cardiovascular - normal exam  Exercise tolerance: good (4-7 METS)    Rhythm: regular  Rate: normal    Neuro/Psych  (+) psychiatric history Anxiety,    GI/Hepatic/Renal/Endo    (+) obesity,      Musculoskeletal     (+) arthralgias,   Abdominal   (+) obese,     Abdomen: soft.   Substance History   (+) drug use     OB/GYN negative ob/gyn ROS         Other   (+) arthritis                          Anesthesia Plan    ASA 2     general and regional   (Popliteal block)  intravenous induction   Anesthetic plan and risks discussed with patient.  Use of blood products discussed with patient  Consented to blood products.

## 2017-01-31 NOTE — ANESTHESIA PROCEDURE NOTES
Airway    General Information and Staff    Patient location during procedure: OR  CRNA: MAG BRADFORD    Indications and Patient Condition  Indications for airway management: airway protection    Preoxygenated: yes  Mask difficulty assessment: 1 - vent by mask    Final Airway Details  Final airway type: endotracheal airway      Successful airway: ETT    Successful intubation technique: direct laryngoscopy  Endotracheal tube insertion site: oral  Blade: Ace  Blade size: 3.5.  ETT size: 7.5 mm  Cormack-Lehane Classification: grade I - full view of glottis  Placement verified by: chest auscultation and capnometry   Measured from: lips  Number of attempts at approach: 1

## 2017-01-31 NOTE — ANESTHESIA POSTPROCEDURE EVALUATION
Patient: Vikash Sargent    Procedure Summary     Date Anesthesia Start Anesthesia Stop Room / Location    01/31/17 0714 1008 [facility] OR 10 / [facility] OR       Procedure Diagnosis Surgeon Provider    ANKLE AND SUBTALOR JOINT ARTHRODESIS, RIGHT ANKLE (Right Ankle) Post-traumatic arthritis of ankle, right; Pain, ankle  (POST TRAUMATIC ANKLE ARTHRITIS, RIGHT) ELYSE Pennington CRNA          Anesthesia Type: general, regional  Last vitals  BP      Temp      Pulse     Resp      SpO2        Post Anesthesia Care and Evaluation      Comments: Patient discharged from PACU without documented anesthesia post-evaluation

## 2017-01-31 NOTE — ANESTHESIA PROCEDURE NOTES
Peripheral Block    Patient location during procedure: pre-op  Start time: 1/31/2017 6:59 AM  Stop time: 1/31/2017 7:02 AM  Reason for block: at surgeon's request and post-op pain management  Performed by  Anesthesiologist: PRIMO PALOMO  Preanesthetic Checklist  Completed: patient identified, site marked, surgical consent, pre-op evaluation, timeout performed, IV checked, risks and benefits discussed and monitors and equipment checked  Peripheral Block Prep:  Sterile barriers:cap, gloves and sterile barriers  Prep: ChloraPrep  Patient monitoring: blood pressure monitoring, continuous pulse oximetry and EKG  Peripheral Procedure  Sedation:yes  Guidance:ultrasound guided and nerve stimulator  Images:still images obtained  Laterality:rightBlock Type:popliteal  Injection Technique:single-shotNeedle Type:echogenic  Needle Gauge:22 G  ULTRASOUND INTERPRETATION. Using ultrasound guidance a 20 G gauge needle was placed in close proximity to the nerve, at which point, under ultrasound guidance anesthetic was injected in the area of the nerve and spread of the anesthesia was seen on ultrasound in close proximity thereto.  There were no abnormalities seen on ultrasound; a digital image was taken; and the patient tolerated the procedure with no complications.   Medications  Local Injected:ropivacaine 0.5% without epinephrine Local Amount Injected:20mL  Post Assessment  Patient Tolerance:comfortable throughout block  Complications:no

## 2017-02-01 PROCEDURE — 25010000002 ENOXAPARIN PER 10 MG: Performed by: PODIATRIST

## 2017-02-01 PROCEDURE — 25010000002 HYDROMORPHONE PER 4 MG: Performed by: PODIATRIST

## 2017-02-01 PROCEDURE — 97161 PT EVAL LOW COMPLEX 20 MIN: CPT

## 2017-02-01 PROCEDURE — G8979 MOBILITY GOAL STATUS: HCPCS | Performed by: PHYSICAL THERAPIST

## 2017-02-01 PROCEDURE — G8978 MOBILITY CURRENT STATUS: HCPCS | Performed by: PHYSICAL THERAPIST

## 2017-02-01 RX ORDER — OXYCODONE AND ACETAMINOPHEN 10; 325 MG/1; MG/1
2 TABLET ORAL EVERY 4 HOURS
Status: DISCONTINUED | OUTPATIENT
Start: 2017-02-01 | End: 2017-02-03 | Stop reason: HOSPADM

## 2017-02-01 RX ADMIN — OXYCODONE HYDROCHLORIDE AND ACETAMINOPHEN 2 TABLET: 10; 325 TABLET ORAL at 20:01

## 2017-02-01 RX ADMIN — CARISOPRODOL 350 MG: 350 TABLET ORAL at 05:19

## 2017-02-01 RX ADMIN — HYDROMORPHONE HYDROCHLORIDE 1 MG: 1 INJECTION, SOLUTION INTRAMUSCULAR; INTRAVENOUS; SUBCUTANEOUS at 05:37

## 2017-02-01 RX ADMIN — OXYCODONE HYDROCHLORIDE AND ACETAMINOPHEN 1 TABLET: 10; 325 TABLET ORAL at 05:18

## 2017-02-01 RX ADMIN — OXYCODONE HYDROCHLORIDE AND ACETAMINOPHEN 1 TABLET: 10; 325 TABLET ORAL at 09:04

## 2017-02-01 RX ADMIN — CARISOPRODOL 350 MG: 350 TABLET ORAL at 14:09

## 2017-02-01 RX ADMIN — SODIUM CHLORIDE, POTASSIUM CHLORIDE, SODIUM LACTATE AND CALCIUM CHLORIDE 50 ML/HR: 600; 310; 30; 20 INJECTION, SOLUTION INTRAVENOUS at 11:04

## 2017-02-01 RX ADMIN — HYDROMORPHONE HYDROCHLORIDE 1 MG: 1 INJECTION, SOLUTION INTRAMUSCULAR; INTRAVENOUS; SUBCUTANEOUS at 18:08

## 2017-02-01 RX ADMIN — OXYCODONE HYDROCHLORIDE AND ACETAMINOPHEN 1 TABLET: 10; 325 TABLET ORAL at 11:54

## 2017-02-01 RX ADMIN — OXYCODONE HYDROCHLORIDE AND ACETAMINOPHEN 1 TABLET: 10; 325 TABLET ORAL at 16:34

## 2017-02-01 RX ADMIN — CARISOPRODOL 350 MG: 350 TABLET ORAL at 22:07

## 2017-02-01 RX ADMIN — OXYCODONE HYDROCHLORIDE AND ACETAMINOPHEN 1 TABLET: 10; 325 TABLET ORAL at 01:27

## 2017-02-01 RX ADMIN — OXYCODONE HYDROCHLORIDE AND ACETAMINOPHEN 1 TABLET: 10; 325 TABLET ORAL at 17:12

## 2017-02-01 RX ADMIN — HYDROMORPHONE HYDROCHLORIDE 1 MG: 1 INJECTION, SOLUTION INTRAMUSCULAR; INTRAVENOUS; SUBCUTANEOUS at 14:51

## 2017-02-01 RX ADMIN — ENOXAPARIN SODIUM 40 MG: 40 INJECTION SUBCUTANEOUS at 09:04

## 2017-02-01 NOTE — PROGRESS NOTES
Orthopedic Foot/Ankle Progress Note    Subjective     Hospital Course:     1 day status post ankle and subtalar joint arthrodesis, right.  Patient resting In bed.  Mother at bedside.  Patient reports pain has not been controlled.  She rates her pain 10/ 10.  Denies any fever, chills, nausea, vomiting, shortness of breath, difficulty breathing.  She is voiding without difficulty.           Objective     Vital signs in last 24 hours:  Temp:  [98.3 °F (36.8 °C)-99 °F (37.2 °C)] 98.4 °F (36.9 °C)  Heart Rate:  [84-94] 94  Resp:  [18-20] 20  BP: (106-125)/(48-60) 125/54    Patient alert and oriented ×3 no distress.    Respirations regular even unlabored.    Splint intact.  Cap refill less than 3 seconds.  Splint clean and dry.      Data Review  CBC:    Lab Results (last 24 hours)     ** No results found for the last 24 hours. **          Assessment/Plan     1 day status post post ankle and subtalar joint arthrodesis, right.      Lovenox for DVT prophylaxis,     Will increase Percocet to 1-2 tablets every 4 hours prn pain.    Continue to be NWB.  Continue to work with PT.    Keep foot elevated/ice behind knee.     Encourage fluids and incentive spirometer.    Will follow.         LOS: 1 day     Sahra Thornton, APRN    Date: 2/1/2017  Time: 4:58 PM

## 2017-02-01 NOTE — PLAN OF CARE
Problem: Patient Care Overview (Adult)  Goal: Plan of Care Review  Outcome: Ongoing (interventions implemented as appropriate)    02/01/17 1016   Coping/Psychosocial Response Interventions   Plan Of Care Reviewed With patient;caregiver   Patient Care Overview   Progress progress toward functional goals as expected   Outcome Evaluation   Outcome Summary/Follow up Plan PT evaluation completed. Patient demonstrates understanding of precautions for Non-weightbearing on Right foot/ankle. Patient was contact guard to min assist for transfers, functional mobility, and gait activities using the rolling walker and contact guard during gait with the knee scooter. Patient able to maintain Non-weightbearing status with all activities. Patient had more difficulty ambulating with the rolling walker and stated this method of ambulation was more difficult, causing her to hop/jar her Left side. Skilled physical therapy would be beneficial to work on transfers, balance strategies while maintaining precautions, and ambulating with the knee scooter and rolling walker. Recommended discharge home with oupatient once patient has healed enough to recieve therapy and recommended rolling walker if she is able to tolerate ambulation with the walker for short distances especially in areas where the knee scooter is difficult to maneuver.          Problem: Inpatient Physical Therapy  Goal: Transfer Training Goal 1 LTG- PT  Outcome: Ongoing (interventions implemented as appropriate)    02/01/17 1016   Transfer Training PT LTG   Transfer Training PT LTG, Date Established 02/01/17   Transfer Training PT LTG, Time to Achieve by discharge   Transfer Training PT LTG, Activity Type bed to chair /chair to bed;sit to stand/stand to sit   Transfer Training PT LTG, [place] Level conditional independence   Transfer Training PT LTG, Assist Device walker, rolling   Transfer Training PT LTG, Additional Goal Pt able to safely perform transfers maintaining  NWB status on R LE.        Goal: Gait Training Goal LTG- PT  Outcome: Ongoing (interventions implemented as appropriate)    02/01/17 1016   Gait Training PT LTG   Gait Training Goal PT LTG, Date Established 02/01/17   Gait Training Goal PT LTG, Time to Achieve by discharge   Gait Training Goal PT LTG, [place] Level conditional independence   Gait Training Goal PT LTG, Assist Device walker, rolling   Gait Training Goal PT LTG, Distance to Achieve 15ft   Gait Training Goal PT LTG, Additional Goal Pt able to maintain NWB on R LE while ambulating with the rolling walker for maneuvering in spaces the knee scooter is unable to be used.        Goal: Dynamic Standing Balance Goal LTG- PT  Outcome: Ongoing (interventions implemented as appropriate)    02/01/17 1016   Dynamic Standing Balance PT LTG   Dynamic Standing Balance PT LTG, Date Established 02/01/17   Dynamic Standing Balance PT LTG, Time to Achieve by discharge   Dynamic Standing Balance PT LTG, [place] Level conditional independence   Dynamic Standing Balance PT LTG, Assist Device assistive Device   Dynamic Standing Balance PT LTG, Additional Goal Pt able to maintain NWB on R LE while performing an UE reaching task with walker and knee scooter

## 2017-02-01 NOTE — OP NOTE
DATE OF PROCEDURE:  01/31/2017     PREOPERATIVE DIAGNOSES:  1.  Posttraumatic arthritis right ankle and subtalar joint.   2.  Right lower extremity pain.     POSTOPERATIVE DIAGNOSES:  1.  Posttraumatic arthritis right ankle and subtalar joint.   2.  Right lower extremity pain.     PROCEDURE PERFORMED:  Ankle and subtalar joint arthrodesis with intramedullary rudy fixation, right.     SURGEON:  Jeremy Wang DPM    ANESTHESIA: General.     HEMOSTASIS: Well-padded pneumatic thigh tourniquet inflated to 250 mmHg.     DESCRIPTION OF PROCEDURE: The patient was brought into the operating room and placed on the operating table in the supine position where general anesthesia was administered. A well-padded pneumatic thigh tourniquet was placed about the patient's right thigh. The patient had a preoperative regional block per anesthesia in the preop holding area. The right lower extremity was prepped and draped in the usual sterile fashion.     PROCEDURE #1: Open ankle and subtalar joint arthrodesis, right.  Attention was then directed to the anterior ankle, Anterior ankle, incisional approach was chosen to salvage her fibula and with the thought of takedown of fusion and ankle joint replacement as a possibility in the future. An approximately 13 cm linear incision was made. It was deepened utilizing sharp and blunt dissection technique. Care was taken to identify and retract neurovascular structures. The extensor retinaculum was incised. The tibialis anterior tendon was retracted medially. Neurovascular bundle and remainder of the tendons were retracted laterally. A linear capsular and periosteal incision was made. The ankle joint was exposed. She had almost complete lack of articular cartilage with 50% of the medial talus.  Significant joint space narrowing with loose bodies at the anterior aspect of the joint. The remaining articular cartilage was resected utilizing a curved osteotome and bur.  The arthrodesis sites  were fenestrated. Reina medical augment was introduced into the joint.     Attention was then directed to the subtalar joint where an incision was created from the tip of the fibula to the 4th metatarsal space. It was deepened utilizing sharp and blunt dissection technique. Care was taken to identify and retract neurovascular structures. The extensor digitorum brevis muscle belly was elevated. Peroneal tendons were retracted plantarly. The joint was distracted open utilizing a lamina . A curved osteotome was utilized to remove remaining cartilage. The arthrodesis site was fenestrated. Reina medical augment  was introduced into the joint.  An incision was created at the plantar aspect of the foot. Blunt dissection was carried down to the calcaneus. An entry wire was then introduced.  The foot was placed at 90° to the leg. The entry reamer was then placed. The foot slightly plantar flexed to allow for appropriate positioning of the entry reamer into the tibia. The anterior was then placed. It was sequentially reamed to 11 mm.  A 10 mm x 150 mm rudy was then introduced. She was in a slightly plantar flexed position due to the repositioning from the entry reamer. Then, 2 screws were placed in the tibia from medial to lateral.  A screw was placed from lateral to medial in the calcaneus. Compression was afforded across the arthrodesis site utilizing the internal compression. Then 2 screws were  placed in the calcaneus, 1 crossing the subtalar joint, both through the rudy. Again, fluoroscopic examination was performed ensuring adequate alignment and fixation. The C-bracket was then removed and an end cap was placed. The wound was then irrigated. The deep fascia was reapproximated utilizing 0 Vicryl. Subcutaneous tissues were reapproximated utilizing 2-0 Vicryl, and the skin was reapproximated utilizing 3-0 nylon. She was again reevaluated for positioning and noted to be slightly plantarflexed, but at this point,  felt that this position would be tolerable  with a small heel lift. A sterile bandage consisting of Xeroform gauze, 4 x 4's, Kerlix, cast padding, posterior and sugar tong-type splint was applied. Pneumatic thigh tourniquet was released and a prompt hyperemic response appreciated.     ESTIMATED BLOOD LOSS: Minimal.     MATERIALS: Reina Medical Valor nail with 5 screws Reina medical augment platelet-derived growth factor.  Sutures as dictated. Injectables: 10 mL of 0.5% Marcaine as a common peroneal block.     PATHOLOGY: None.     COMPLICATIONS: None.     The patient tolerated the procedure and anesthesia well and was transferred from the operating room to the recovery room in apparent satisfactory condition with vital signs stable, in no apparent distress. Written and verbal postoperative care instructions were given, which include nonweightbearing to the right lower extremity. She will be admitted for postoperative pain control and followed closely while in the hospital.         cc:              Jeremy Wang DPM  /[id number]  D:  02/01/2017 14:06:12(Eastern Time)  T:  02/01/2017 17:35:56(Eastern Time)  Voice ID:  [id number]/Document ID:  [id number]

## 2017-02-01 NOTE — PLAN OF CARE
Problem: Patient Care Overview (Adult)  Goal: Plan of Care Review  Outcome: Ongoing (interventions implemented as appropriate)    02/01/17 044   Coping/Psychosocial Response Interventions   Plan Of Care Reviewed With patient;mother   Patient Care Overview   Progress improving   Outcome Evaluation   Outcome Summary/Follow up Plan pain not very much, a 2 at most, turns well per self, kay to be dc'd this am, wants to go home today         Problem: Perioperative Period (Adult)  Goal: Signs and Symptoms of Listed Potential Problems Will be Absent or Manageable (Perioperative Period)  Outcome: Ongoing (interventions implemented as appropriate)    Problem: Pain, Acute (Adult)  Goal: Identify Related Risk Factors and Signs and Symptoms  Outcome: Outcome(s) achieved Date Met:  02/01/17  Goal: Acceptable Pain Control/Comfort Level  Outcome: Ongoing (interventions implemented as appropriate)

## 2017-02-01 NOTE — PLAN OF CARE
Problem: Patient Care Overview (Adult)  Goal: Plan of Care Review  Outcome: Ongoing (interventions implemented as appropriate)    02/01/17 1317   Coping/Psychosocial Response Interventions   Plan Of Care Reviewed With patient   Patient Care Overview   Progress improving   Outcome Evaluation   Outcome Summary/Follow up Plan pt is doing well today and is able to get up and work with therapy. She had a bad morning with pain control but it is much better this afternoon       Goal: Adult Individualization and Mutuality  Outcome: Ongoing (interventions implemented as appropriate)  Goal: Discharge Needs Assessment  Outcome: Ongoing (interventions implemented as appropriate)    Problem: Perioperative Period (Adult)  Goal: Signs and Symptoms of Listed Potential Problems Will be Absent or Manageable (Perioperative Period)  Outcome: Ongoing (interventions implemented as appropriate)

## 2017-02-01 NOTE — PROGRESS NOTES
Acute Care - Physical Therapy Initial Evaluation  [facility]     Patient Name: Vikash Sargent  : 1975  MRN: [id number]  Today's Date: 2017   Onset of Illness/Injury or Date of Surgery Date: 17  Date of Referral to PT: 17  Referring Physician: Dr. Jeremy Wang      Admit Date: 2017     Visit Dx:    ICD-10-CM ICD-9-CM   1. Impaired functional mobility, balance, gait, and endurance Z74.09 V49.89     Patient Active Problem List   Diagnosis   • Post-traumatic arthritis of ankle     Past Medical History   Diagnosis Date   • Anxiety    • Concussion with brief LOC    • Panic attack      Past Surgical History   Procedure Laterality Date   • Laparoscopic tubal ligation     • Cholecystectomy     • Rotator cuff repair Right    • Pr arthrodesis,ankle,open Right 2017     Procedure: ANKLE AND SUBTALOR JOINT ARTHRODESIS, RIGHT ANKLE;  Surgeon: Jeremy Wang DPM;  Location: [facility];  Service: Podiatry          PT ASSESSMENT (last 72 hours)      PT Evaluation       17 [phone] 17 1129    Rehab Evaluation    Document Type evaluation  -MS (r) KM (t) MS (c)     Subjective Information agree to therapy;complains of;pain  -MS (r) KM (t) MS (c)     Symptoms Noted During/After Treatment dizziness  -MS (r) KM (t) MS (c)     General Information    Patient Profile Review yes  -MS (r) KM (t) MS (c)     Onset of Illness/Injury or Date of Surgery Date 17  -MS (r) KM (t) MS (c)     Referring Physician Dr. Jeremy Wang  -MS (r) KM (t) MS (c)     General Observations Pt is alert and awake; lying supine in bed; HOB elevated; IV; not on supplemental oxygen  -MS (r) KM (t) MS (c)     Pertinent History Of Current Problem History of trauma, CT reveals cystic changes of tibia and talus with joint space narrowing, old fracture of talus body extending into subtalar jt that is not healed; R ankle and subtalar joint arthrodesis 2017  -MS (r) KM (t) MS (c)     Precautions/Limitations fall  precautions  -MS (r) KM (t) MS (c)     Prior Level of Function independent:;all household mobility;community mobility;ADL's;home management;driving;transfer  -MS (r) KM (t) MS (c)     Equipment Currently Used at Home other (see comments)   Has a scooter for after surgery  -MS (r) KM (t) MS (c)     Plans/Goals Discussed With patient and family;agreed upon  -MS (r) KM (t) MS (c)     Risks Reviewed patient and family:;LOB;dizziness;change in vital signs;nausea/vomiting  -MS (r) KM (t) MS (c)     Benefits Reviewed patient and family:;improve function;increase independence;increase strength;increase balance;decrease pain  -MS (r) KM (t) MS (c)     Barriers to Rehab physical barrier  -MS (r) KM (t) MS (c)     Living Environment    Lives With child(scot), dependent;grandparent(s)   3children;  -MS (r) KM (t) MS (c) child(scot), dependent;significant other  -SR    Living Arrangements house  -MS (r) KM (t) MS (c) house  -SR    Home Accessibility stairs to enter home;bed and bath on same level  -MS (r) KM (t) MS (c) no concerns  -SR    Number of Stairs to Enter Home 1   elevated threshold  -MS (r) KM (t) MS (c)     Stair Railings at Home none  -MS (r) KM (t) MS (c) none  -SR    Type of Financial/Environmental Concern  none  -SR    Transportation Available  car;family or friend will provide  -SR    Living Environment Comment Pt plans to live with mother after discharge; mother's home has walk-in shower, shower chair, elevated threshold to enter home with no railing, 1 small dog, pt states enough room in home to use scooter  -MS (r) KM (t) MS (c)     Clinical Impression    Date of Referral to PT 02/01/17  -MS (r) KM (t) MS (c)     Patient/Family Goals Statement go home; use of scooter to ambulate  -MS (r) KM (t) MS (c)     Criteria for Skilled Therapeutic Interventions Met yes;treatment indicated  -MS (r) KM (t) MS (c)     Pathology/Pathophysiology Noted (Describe Specifically for Each System) musculoskeletal  -MS (r) KM (t) MS  (c)     Impairments Found (describe specific impairments) gait, locomotion, and balance  -MS (r) KM (t) MS (c)     Rehab Potential good, to achieve stated therapy goals  -MS (r) KM (t) MS (c)     Predicted Duration of Therapy Intervention (days/wks) until discharge  -MS (r) KM (t) MS (c)     Vital Signs    Intra SpO2 (%) 95  -MS (r) KM (t) MS (c)     O2 Delivery Intra Treatment room air  -MS (r) KM (t) MS (c)     Post SpO2 (%) 97  -MS (r) KM (t) MS (c)     O2 Delivery Post Treatment room air  -MS (r) KM (t) MS (c)     Intra Patient Position Sitting  -MS (r) KM (t) MS (c)     Post Patient Position Sitting  -MS (r) KM (t) MS (c)     Pain Assessment    Pain Assessment 0-10  -MS (r) KM (t) MS (c)     Pain Score 4  -MS (r) KM (t) MS (c)     Pain Type Acute pain;Surgical pain  -MS (r) KM (t) MS (c)     Pain Location Ankle  -MS (r) KM (t) MS (c)     Pain Orientation Right  -MS (r) KM (t) MS (c)     Pain Radiating Towards R Ross  -MS (r) KM (t) MS (c)     Pain Descriptors Aching   Pressure; Vice in Heel/Ankle  -MS (r) KM (t) MS (c)     Pain Intervention(s) Ambulation/increased activity;Medication (See MAR);Repositioned  -MS (r) KM (t) MS (c)     Response to Interventions tolerated  -MS (r) KM (t) MS (c)     Vision Assessment/Intervention    Visual Impairment WFL  -MS (r) KM (t) MS (c)     ROM (Range of Motion)    General ROM --  -MS (r) KM (t) MS (c)     General ROM Detail Unable to test R ankle ROM due to surgical procedure; No other deficits noted  -MS (r) KM (t) MS (c)     MMT (Manual Muscle Testing)    General MMT Assessment Detail Unable to test R ankle MMT due to surgical procedure; No other deficits noted  -MS (r) KM (t) MS (c)     Mobility Assessment/Training    Extremity Weight-Bearing Status right lower extremity  -MS (r) KM (t) MS (c)     Right Lower Extremity Weight-Bearing non weight-bearing  -MS (r) KM (t) MS (c)     Bed Mobility, Assessment/Treatment    Bed Mobility, Roll Left, [place] verbal cues  required;supervision required  -MS (r) KM (t) MS (c)     Bed Mobility, Roll Right, [place] verbal cues required;supervision required  -MS (r) KM (t) MS (c)     Bed Mobility, Scoot/Bridge, [place] supervision required  -MS (r) KM (t) MS (c)     Bed Mob, Supine to Sit, [place] supervision required;verbal cues required  -MS (r) KM (t) MS (c)     Bed Mob, Sit to Supine, [place] supervision required;verbal cues required  -MS (r) KM (t) MS (c)     Bed Mobility, Safety Issues other (see comments)   NWB on R ankle/foot  -MS (r) KM (t) MS (c)     Bed Mobility, Impairments pain  -MS (r) KM (t) MS (c)     Transfer Assessment/Treatment    Transfers, Sit-Stand [place] contact guard assist;minimum assist (75% patient effort)  -MS (r) KM (t) MS (c)     Transfers, Stand-Sit [place] contact guard assist;minimum assist (75% patient effort)  -MS (r) KM (t) MS (c)     Transfers, Sit-Stand-Sit, Assist Device rolling walker  -MS (r) KM (t) MS (c)     Transfer, Safety Issues balance decreased during turns;other (see comments)   NWB on R foot/ankle  -MS (r) KM (t) MS (c)     Transfer, Impairments pain;impaired balance  -MS (r) KM (t) MS (c)     Gait Assessment/Treatment    Gait, [place] Level contact guard assist;minimum assist (75% patient effort)  -MS (r) KM (t) MS (c)     Gait, Assistive Device rolling walker;other (see comments)   scooter  -MS (r) KM (t) MS (c)     Gait, Distance (Feet) --   100ft scooter; 15ft rolling walker  -MS (r) KM (t) MS (c)     Gait, Maintain Weight Bearing Status able to maintain weight bearing status  -MS (r) KM (t) MS (c)     Gait, Safety Issues balance decreased during turns  -MS (r) KM (t) MS (c)     Gait, Impairments pain  -MS (r) KM (t) MS (c)     Gait, Comment Pt states walker more difficult to maneuver than scooter due to hopping is harder on her other leg; educated that scooter may be more difficult to use in tighter spaces  -MS (r) KM (t) MS (c)     Motor  Skills/Interventions    Additional Documentation Balance Skills Training (Group)  -MS (r) KM (t) MS (c)     Balance Skills Training    Sitting-Level of Assistance Independent  -MS (r) KM (t) MS (c)     Sitting-Balance Support Feet supported   L LE supported; R LE unsupported  -MS (r) KM (t) MS (c)     Standing-Level of Assistance Contact guard;Minimum assistance  -MS (r) KM (t) MS (c)     Static Standing Balance Support assistive device  -MS (r) KM (t) MS (c)     Standing Balance # of Minutes Pt able to maintain NWB status on R LE with rolling walker and scooter  -MS (r) KM (t) MS (c)     Gait Balance-Level of Assistance Contact guard;Minimum assistance  -MS (r) KM (t) MS (c)     Gait Balance Support assistive device  -MS (r) KM (t) MS (c)     Gait Balance # of Minutes Pt is contact guard for ambulating with scooter; contact guard to min assist with ambulating with rolling walker  -MS (r) KM (t) MS (c)     Sensory Assessment/Intervention    Light Touch LUE;RUE;LLE;RLE  -MS (r) KM (t) MS (c)     LUE Light Touch WNL  -MS (r) KM (t) MS (c)     RUE Light Touch WNL  -MS (r) KM (t) MS (c)     LLE Light Touch WNL  -MS (r) KM (t) MS (c)     RLE Light Touch mild impairment   pt reported intermittent pins/needles; not fully tested  -MS (r) KM (t) MS (c)     General Interventions    Stair Training Pt has elevated threshold to enter home  -MS (r) KM (t) MS (c)     Positioning and Restraints    Pre-Treatment Position in bed  -MS (r) KM (t) MS (c)     Post Treatment Position bed  -MS (r) KM (t) MS (c)     In Bed fowlers;call light within reach;encouraged to call for assist;with family/caregiver  -MS (r) KM (t) MS (c)       01/31/17 1127       General Information    Equipment Currently Used at Home none  -SR       User Key  (r) = Recorded By, (t) = Taken By, (c) = Cosigned By    Initials Name Provider Type    MS Regina Larry, PT DPT Physical Therapist    SR Kristy Clement, RN Registered Nurse    KM Swapna Samano, PT Student  PT Student          Physical Therapy Education     Title: PT OT SLP Therapies (Active)     Topic: Physical Therapy (Active)     Point: Mobility training (Done)    Learning Progress Summary    Learner Readiness Method Response Comment Documented by Status   Patient Acceptance E,D VU maintain NWB status with all transfers and gait activities KM 02/01/17 1014 Done   Family Acceptance E,D VU maintain NWB status with all transfers and gait activities KM 02/01/17 1014 Done               Point: Precautions (Done)    Learning Progress Summary    Learner Readiness Method Response Comment Documented by Status   Patient Acceptance E VU NWB on R foot/ankle; precautions with young children and animals at home KM 02/01/17 1013 Done   Family Acceptance E VU NWB on R foot/ankle; precautions with young children and animals at home KM 02/01/17 1013 Done                      User Key     Initials Effective Dates Name Provider Type Discipline     12/19/16 -  Swapna Samano, PT Student PT Student PT                PT Recommendation and Plan  Anticipated Equipment Needs At Discharge: two wheeled walker  Anticipated Discharge Disposition: home with assist, home with home health  Planned Therapy Interventions: gait training, transfer training, balance training, bed mobility training, stair training  PT Frequency: 2 times/day, daily, per priority policy  Plan of Care Review  Plan Of Care Reviewed With: patient, caregiver  Progress: progress toward functional goals as expected  Outcome Summary/Follow up Plan: PT evaluation completed.  Patient demonstrates understanding of precautions for Non-weightbearing on Right foot/ankle.  Patient was contact guard to min assist for transfers, functional mobility, and gait activities using the rolling walker and contact guard during gait with the knee scooter.  Patient able to maintain Non-weightbearing status with all activities.  Patient had more difficulty ambulating with the rolling walker and  stated this method of ambulation was more difficult, causing her to hop/jar her Left side.  Skilled physical therapy would be beneficial to work on transfers, balance strategies while maintaining precautions, and ambulating with the knee scooter and rolling walker.  Recommended discharge home with outpatient once patient has healed enough to recieve therapy and recommended rolling walker if she is able to tolerate ambulation with the walker for short distances especially in areas where the knee scooter is difficult to maneuver.           IP PT Goals       02/01/17 1016          Transfer Training PT LTG    Transfer Training PT LTG, Date Established 02/01/17  -MS (r) KM (t) MS (c)      Transfer Training PT LTG, Time to Achieve by discharge  -MS (r) KM (t) MS (c)      Transfer Training PT LTG, Activity Type bed to chair /chair to bed;sit to stand/stand to sit  -MS (r) KM (t) MS (c)      Transfer Training PT LTG, [place] Level conditional independence  -MS (r) KM (t) MS (c)      Transfer Training PT LTG, Assist Device other (see comments)   Knee Scooter  -MS (r) KM (t) MS (c)      Transfer Training PT LTG, Additional Goal Pt able to safely perform transfers maintaining NWB status on R LE.   -MS (r) KM (t) MS (c)      Gait Training PT LTG    Gait Training Goal PT LTG, Date Established 02/01/17  -MS (r) KM (t) MS (c)      Gait Training Goal PT LTG, Time to Achieve by discharge  -MS (r) KM (t) MS (c)      Gait Training Goal PT LTG, [place] Level conditional independence  -MS (r) KM (t) MS (c)      Gait Training Goal PT LTG, Assist Device walker, rolling  -MS (r) KM (t) MS (c)      Gait Training Goal PT LTG, Distance to Achieve 15ft  -MS (r) KM (t) MS (c)      Gait Training Goal PT LTG, Additional Goal Pt able to maintain NWB on R LE while ambulating with the rolling walker for maneuvering in spaces the knee scooter is unable to be used.   -MS (r) KM (t) MS (c)      Dynamic Standing Balance PT LTG    Dynamic  Standing Balance PT LTG, Date Established 02/01/17  -MS (r) KM (t) MS (c)      Dynamic Standing Balance PT LTG, Time to Achieve by discharge  -MS (r) KM (t) MS (c)      Dynamic Standing Balance PT LTG, [place] Level conditional independence  -MS (r) KM (t) MS (c)      Dynamic Standing Balance PT LTG, Assist Device assistive Device  -MS (r) KM (t) MS (c)      Dynamic Standing Balance PT LTG, Additional Goal Pt able to maintain NWB on R LE while performing an UE reaching task with walker and knee scooter  -MS (r) KM (t) MS (c)        User Key  (r) = Recorded By, (t) = Taken By, (c) = Cosigned By    Initials Name Provider Type    MS Regina Larry, PT DPT Physical Therapist    BIRGIT Samano, PT Student PT Student                Outcome Measures       02/01/17 1000          How much help from another person do you currently need...    Turning from your back to your side while in flat bed without using bedrails? 4  -MS (r) KM (t) MS (c)      Moving from lying on back to sitting on the side of a flat bed without bedrails? 3  -MS (r) KM (t) MS (c)      Moving to and from a bed to a chair (including a wheelchair)? 3  -MS (r) KM (t) MS (c)      Standing up from a chair using your arms (e.g., wheelchair, bedside chair)? 3  -MS (r) KM (t) MS (c)      Climbing 3-5 steps with a railing? 2  -MS (r) KM (t) MS (c)      To walk in hospital room? 3  -MS (r) KM (t) MS (c)      AM-PAC 6 Clicks Score 18  -MS (r) KM (t)      Functional Assessment    Outcome Measure Options AM-PAC 6 Clicks Basic Mobility (PT)  -MS (r) KM (t) MS (c)        User Key  (r) = Recorded By, (t) = Taken By, (c) = Cosigned By    Initials Name Provider Type    MS Regina Larry, PT DPT Physical Therapist    BIRGIT Samano, PT Student PT Student           Time Calculation:         PT Charges       02/01/17 0951          Time Calculation    Start Time 0853  -MS (r) KM (t) MS (c)      Stop Time 0942  -MS (r) KM (t) MS (c)      Time Calculation (min) 49  min  -MS (r) KM (t)      PT Received On 02/01/17  -MS (r) KM (t) MS (c)      PT Goal Re-Cert Due Date 02/11/17  -MS (r) KM (t) MS (c)        User Key  (r) = Recorded By, (t) = Taken By, (c) = Cosigned By    Initials Name Provider Type    MS Regina Larry, PT DPT Physical Therapist    KM Swapna Samano, PT Student PT Student          Therapy Charges for Today     Code Description Service Date Service Provider Modifiers Qty    55106223062 HC PT EVAL LOW COMPLEXITY 4 2/1/2017 Swapna Samano, PT Student GP 1          PT G-Codes  PT Professional Judgement Used?: Yes  Outcome Measure Options: AM-PAC 6 Clicks Basic Mobility (PT)  Score: 18  Functional Limitation: Mobility: Walking and moving around  Mobility: Walking and Moving Around Current Status (): At least 40 percent but less than 60 percent impaired, limited or restricted  Mobility: Walking and Moving Around Goal Status (): At least 1 percent but less than 20 percent impaired, limited or restricted      Swapna Samano, PT Student  2/1/2017

## 2017-02-02 PROCEDURE — 97116 GAIT TRAINING THERAPY: CPT

## 2017-02-02 PROCEDURE — 25010000002 ENOXAPARIN PER 10 MG: Performed by: PODIATRIST

## 2017-02-02 PROCEDURE — 63710000001 DIPHENHYDRAMINE PER 50 MG: Performed by: PODIATRIST

## 2017-02-02 RX ORDER — DIPHENHYDRAMINE HCL 25 MG
25 CAPSULE ORAL EVERY 6 HOURS PRN
Status: DISCONTINUED | OUTPATIENT
Start: 2017-02-02 | End: 2017-02-03 | Stop reason: HOSPADM

## 2017-02-02 RX ORDER — DOCUSATE SODIUM 100 MG/1
100 CAPSULE, LIQUID FILLED ORAL 2 TIMES DAILY
Status: DISCONTINUED | OUTPATIENT
Start: 2017-02-02 | End: 2017-02-03 | Stop reason: HOSPADM

## 2017-02-02 RX ADMIN — OXYCODONE HYDROCHLORIDE AND ACETAMINOPHEN 2 TABLET: 10; 325 TABLET ORAL at 00:12

## 2017-02-02 RX ADMIN — OXYCODONE HYDROCHLORIDE AND ACETAMINOPHEN 2 TABLET: 10; 325 TABLET ORAL at 08:20

## 2017-02-02 RX ADMIN — OXYCODONE HYDROCHLORIDE AND ACETAMINOPHEN 2 TABLET: 10; 325 TABLET ORAL at 23:47

## 2017-02-02 RX ADMIN — OXYCODONE HYDROCHLORIDE AND ACETAMINOPHEN 1 TABLET: 10; 325 TABLET ORAL at 12:55

## 2017-02-02 RX ADMIN — ENOXAPARIN SODIUM 40 MG: 40 INJECTION SUBCUTANEOUS at 08:20

## 2017-02-02 RX ADMIN — CARISOPRODOL 350 MG: 350 TABLET ORAL at 20:37

## 2017-02-02 RX ADMIN — OXYCODONE HYDROCHLORIDE AND ACETAMINOPHEN 2 TABLET: 10; 325 TABLET ORAL at 20:37

## 2017-02-02 RX ADMIN — OXYCODONE HYDROCHLORIDE AND ACETAMINOPHEN 2 TABLET: 10; 325 TABLET ORAL at 04:08

## 2017-02-02 RX ADMIN — DIPHENHYDRAMINE HYDROCHLORIDE 25 MG: 25 CAPSULE ORAL at 23:53

## 2017-02-02 RX ADMIN — DIPHENHYDRAMINE HYDROCHLORIDE 25 MG: 25 CAPSULE ORAL at 16:50

## 2017-02-02 RX ADMIN — DOCUSATE SODIUM 100 MG: 100 CAPSULE ORAL at 16:50

## 2017-02-02 RX ADMIN — OXYCODONE HYDROCHLORIDE AND ACETAMINOPHEN 2 TABLET: 10; 325 TABLET ORAL at 16:19

## 2017-02-02 RX ADMIN — CARISOPRODOL 350 MG: 350 TABLET ORAL at 06:14

## 2017-02-02 NOTE — PLAN OF CARE
Problem: Patient Care Overview (Adult)  Goal: Plan of Care Review  Outcome: Ongoing (interventions implemented as appropriate)    02/02/17 1640   Coping/Psychosocial Response Interventions   Plan Of Care Reviewed With patient   Patient Care Overview   Progress improving   Outcome Evaluation   Outcome Summary/Follow up Plan patient is doing well today and is up with one assist to the bathroom. pt's pain is much lower today and is controlled with PO pills. She has been complaining of itching and benedryl has been ordered. Patient has also not had a BM but refuses any strong laxatives. Dr. Wang prescribed colace.       Goal: Adult Individualization and Mutuality  Outcome: Ongoing (interventions implemented as appropriate)  Goal: Discharge Needs Assessment  Outcome: Ongoing (interventions implemented as appropriate)    Problem: Perioperative Period (Adult)  Goal: Signs and Symptoms of Listed Potential Problems Will be Absent or Manageable (Perioperative Period)  Outcome: Ongoing (interventions implemented as appropriate)

## 2017-02-02 NOTE — PLAN OF CARE
Problem: Patient Care Overview (Adult)  Goal: Plan of Care Review  Outcome: Ongoing (interventions implemented as appropriate)    02/02/17 5816   Coping/Psychosocial Response Interventions   Plan Of Care Reviewed With patient;mother   Patient Care Overview   Progress improving   Outcome Evaluation   Outcome Summary/Follow up Plan gets up well with minimal standby assist, uses walker great, NWB on right foot and she doesnt use it at all. pain more controlled with the 2 pain pills, and the soma. she is ready to go home she said         Problem: Perioperative Period (Adult)  Goal: Signs and Symptoms of Listed Potential Problems Will be Absent or Manageable (Perioperative Period)  Outcome: Ongoing (interventions implemented as appropriate)

## 2017-02-02 NOTE — PLAN OF CARE
Problem: Patient Care Overview (Adult)  Goal: Plan of Care Review  Outcome: Ongoing (interventions implemented as appropriate)    02/02/17 1131   Coping/Psychosocial Response Interventions   Plan Of Care Reviewed With patient   Patient Care Overview   Progress improving   Outcome Evaluation   Outcome Summary/Follow up Plan Pt took steps to knee walker and back to bed with RW. Pt then went 80ft with knee walker SBA.

## 2017-02-02 NOTE — PROGRESS NOTES
Orthopedic Foot/Ankle Progress Note    Subjective     Hospital Course: Stable    Pain improved greatly today.  She feels that she is having some itching from the Percocet.  He has some fullness in her ears.  I did get up with physical therapy today and did vomit once.  She relates she is feeling much better this afternoon        Objective     Vital signs in last 24 hours:  Temp:  [98 °F (36.7 °C)-99.1 °F (37.3 °C)] 98.3 °F (36.8 °C)  Heart Rate:  [86-97] 86  Resp:  [16-20] 16  BP: (118-142)/(53-76) 118/69    Splint intact    Data Review  CBC:    Lab Results (last 24 hours)     ** No results found for the last 24 hours. **      S LOS: 2 days       Assessment  *Day #2 status post ankle and subtalar joint arthrodesis    Plan    Chest her condition with her mother today.  I did add Benadryl when necessary for itching as well as Colace.  Fatigue be nonweightbearing to physical therapy.  Hopefully get her home tomorrow I swim exerciseside effects and the Percocet prior to her discharge.  Jeremy Wang DPM    Date: 2/2/2017  Time: 3:38 PM    Assessment/Plan

## 2017-02-03 VITALS
WEIGHT: 262.25 LBS | TEMPERATURE: 98.4 F | HEART RATE: 93 BPM | SYSTOLIC BLOOD PRESSURE: 151 MMHG | RESPIRATION RATE: 16 BRPM | DIASTOLIC BLOOD PRESSURE: 53 MMHG | HEIGHT: 69 IN | BODY MASS INDEX: 38.84 KG/M2 | OXYGEN SATURATION: 99 %

## 2017-02-03 PROCEDURE — 25010000002 ENOXAPARIN PER 10 MG: Performed by: PODIATRIST

## 2017-02-03 PROCEDURE — 63710000001 DIPHENHYDRAMINE PER 50 MG: Performed by: PODIATRIST

## 2017-02-03 PROCEDURE — 97530 THERAPEUTIC ACTIVITIES: CPT

## 2017-02-03 RX ORDER — OXYCODONE AND ACETAMINOPHEN 10; 325 MG/1; MG/1
2 TABLET ORAL EVERY 4 HOURS
Qty: 60 TABLET | Refills: 0
Start: 2017-02-03 | End: 2017-02-10

## 2017-02-03 RX ORDER — ONDANSETRON 4 MG/1
4 TABLET, FILM COATED ORAL EVERY 8 HOURS PRN
Qty: 30 TABLET | Refills: 0 | Status: SHIPPED | OUTPATIENT
Start: 2017-02-03 | End: 2017-05-05

## 2017-02-03 RX ORDER — CARISOPRODOL 350 MG/1
350 TABLET ORAL EVERY 8 HOURS SCHEDULED
Qty: 40 TABLET | Refills: 0 | Status: SHIPPED | OUTPATIENT
Start: 2017-02-03 | End: 2017-02-11

## 2017-02-03 RX ADMIN — OXYCODONE HYDROCHLORIDE AND ACETAMINOPHEN 2 TABLET: 10; 325 TABLET ORAL at 12:39

## 2017-02-03 RX ADMIN — ENOXAPARIN SODIUM 40 MG: 40 INJECTION SUBCUTANEOUS at 08:07

## 2017-02-03 RX ADMIN — OXYCODONE HYDROCHLORIDE AND ACETAMINOPHEN 1 TABLET: 10; 325 TABLET ORAL at 04:13

## 2017-02-03 RX ADMIN — CARISOPRODOL 350 MG: 350 TABLET ORAL at 08:08

## 2017-02-03 RX ADMIN — CARISOPRODOL 350 MG: 350 TABLET ORAL at 14:01

## 2017-02-03 RX ADMIN — DIPHENHYDRAMINE HYDROCHLORIDE 25 MG: 25 CAPSULE ORAL at 08:12

## 2017-02-03 RX ADMIN — OXYCODONE HYDROCHLORIDE AND ACETAMINOPHEN 1 TABLET: 10; 325 TABLET ORAL at 08:07

## 2017-02-03 RX ADMIN — DOCUSATE SODIUM 100 MG: 100 CAPSULE ORAL at 08:07

## 2017-02-03 NOTE — PLAN OF CARE
Problem: Inpatient Physical Therapy  Goal: Transfer Training Goal 1 LTG- PT  Outcome: Outcome(s) achieved Date Met:  02/03/17 02/01/17 1016 02/03/17 1614   Transfer Training PT LTG   Transfer Training PT LTG, Date Established 02/01/17 --    Transfer Training PT LTG, Time to Achieve by discharge --    Transfer Training PT LTG, Activity Type bed to chair /chair to bed;sit to stand/stand to sit --    Transfer Training PT LTG, [place] Level conditional independence --    Transfer Training PT LTG, Assist Device other (see comments)  (Knee Scooter) --    Transfer Training PT LTG, Additional Goal Pt able to safely perform transfers maintaining NWB status on R LE.  --    Transfer Training PT LTG, Date Goal Reviewed --  02/03/17   Transfer Training PT LTG, Outcome --  goal met       Goal: Gait Training Goal LTG- PT  Outcome: Outcome(s) achieved Date Met:  02/03/17 02/01/17 1016 02/03/17 1614   Gait Training PT LTG   Gait Training Goal PT LTG, Date Established 02/01/17 --    Gait Training Goal PT LTG, Time to Achieve by discharge --    Gait Training Goal PT LTG, [place] Level conditional independence --    Gait Training Goal PT LTG, Assist Device walker, rolling --    Gait Training Goal PT LTG, Distance to Achieve 15ft --    Gait Training Goal PT LTG, Additional Goal Pt able to maintain NWB on R LE while ambulating with the rolling walker for maneuvering in spaces the knee scooter is unable to be used.  --    Gait Training Goal PT LTG, Date Goal Reviewed --  02/03/17   Gait Training Goal PT LTG, Outcome --  goal met       Goal: Dynamic Standing Balance Goal LTG- PT  Outcome: Unable to achieve outcome(s) by discharge Date Met:  02/03/17 02/01/17 1016 02/03/17 1614   Dynamic Standing Balance PT LTG   Dynamic Standing Balance PT LTG, Date Established 02/01/17 --    Dynamic Standing Balance PT LTG, Time to Achieve by discharge --    Dynamic Standing Balance PT LTG, [place] Level conditional independence --     Dynamic Standing Balance PT LTG, Assist Device assistive Device --    Dynamic Standing Balance PT LTG, Additional Goal Pt able to maintain NWB on R LE while performing an UE reaching task with walker and knee scooter --    Dynamic Standing Balance PT LTG, Date Goal Reviewed --  02/03/17   Dynamic Standing Balance PT LTG, Outcome --  goal not met   Dynamic Standing Balance PT LTG, Reason Goal Not Met --  discharged from facility

## 2017-02-03 NOTE — DISCHARGE SUMMARY
[facility] Discharge Summary     Date of Discharge:  2/3/2017    Discharge Diagnosis: 3 days status post ankle and subtalar joint arthrodesis, right.  Patient resting in bed.  Pain controlled.  Comfortable with Discharge home today.      Presenting Problem/History of Present Illness  Post-traumatic arthritis of ankle, right [M19.171]     Hospital Course  Patient is a 41 y.o. female presented for right ankle surgery.  Patient admitted post-operatively for pain control.  Pain controlled.  Patient doing well and ready for discharge home.      Procedures Performed  Procedure(s):  ANKLE AND SUBTALOR JOINT ARTHRODESIS, RIGHT ANKLE       Consults:   Consults     No orders found from 1/2/2017 to 2/1/2017.          Condition on Discharge:  Stable     Vital Signs  Temp:  [98.4 °F (36.9 °C)] 98.4 °F (36.9 °C)  Heart Rate:  [93-96] 93  Resp:  [16] 16  BP: (128-151)/(53-57) 151/53    Physical Exam:   Physical Exam  Patient alert and oriented ×3 no distress.     Respirations regular even unlabored.     Splint intact. Cap refill less than 3 seconds. Splint clean and dry.      Discharge Disposition  Home or Self Care    Discharge Medications   Vikash Sargent   Home Medication Instructions JANA:[id number]    Printed on:02/03/17 1252   Medication Information                      carisoprodol (SOMA) 350 MG tablet  Take 1 tablet by mouth Every 8 (Eight) Hours for 8 days.             enoxaparin (LOVENOX) 40 MG/0.4ML solution syringe  Inject 0.4 mL under the skin Every 12 (Twelve) Hours.             ondansetron (ZOFRAN) 4 MG tablet  Take 1 tablet by mouth Every 8 (Eight) Hours As Needed for nausea or vomiting.             oxyCODONE-acetaminophen (PERCOCET)  MG per tablet  Take 2 tablets by mouth Every 4 (Four) Hours for 40 doses.             rOPINIRole (REQUIP) 2 MG tablet  Take 2 mg by mouth Daily As Needed (for restless legs).                 Discharge Diet: regular as tolerated     Activity at  Discharge:   Activity Instructions     Weight Bearing Status       Weight Bearing Status:  Non-Weight Bearing                 Follow-up Appointments  No future appointments.  Referrals and Follow-ups to Schedule     Apply Ice to Affected Ankle / Foot Every 2 Hours    As directed        Return to Clinic for Follow Up    As directed    Follow-up in 3-5 days with Dr. Wang                 Patient to be completely NWB to right lower extremity.    RX given for pain medication, Lovenox, Soma, Zofran    Follow-up with Dr. Wang in 3-5 days.           Sahra Thornton, NATIVIDAD  02/03/17  12:52 PM

## 2017-02-03 NOTE — PLAN OF CARE
Problem: Patient Care Overview (Adult)  Goal: Plan of Care Review  Outcome: Ongoing (interventions implemented as appropriate)    02/03/17 0981   Coping/Psychosocial Response Interventions   Plan Of Care Reviewed With patient   Patient Care Overview   Progress progress toward functional goals as expected   Outcome Evaluation   Outcome Summary/Follow up Plan Pt required min assist for bed mobility, cga assist for transfers. Pt ambulated 25' CGA with rolling walker. Pt will beneit from continued therapy to increase strength and mobility.

## 2017-02-03 NOTE — PLAN OF CARE
Problem: Patient Care Overview (Adult)  Goal: Adult Individualization and Mutuality  Outcome: Ongoing (interventions implemented as appropriate)  Goal: Discharge Needs Assessment  Outcome: Ongoing (interventions implemented as appropriate)    Problem: Perioperative Period (Adult)  Goal: Signs and Symptoms of Listed Potential Problems Will be Absent or Manageable (Perioperative Period)  Outcome: Ongoing (interventions implemented as appropriate)

## 2017-02-03 NOTE — PLAN OF CARE
Problem: Patient Care Overview (Adult)  Goal: Adult Individualization and Mutuality  Outcome: Outcome(s) achieved Date Met:  02/03/17  Goal: Discharge Needs Assessment  Outcome: Outcome(s) achieved Date Met:  02/03/17    Problem: Perioperative Period (Adult)  Goal: Signs and Symptoms of Listed Potential Problems Will be Absent or Manageable (Perioperative Period)  Outcome: Outcome(s) achieved Date Met:  02/03/17

## 2017-02-03 NOTE — THERAPY DISCHARGE NOTE
Acute Care - Physical Therapy Discharge Summary  [facility]       Patient Name: Vikash Sargent  : 1975  MRN: [id number]    Today's Date: 2/3/2017  Onset of Illness/Injury or Date of Surgery Date: 17    Date of Referral to PT: 17  Referring Physician: Dr. Jeremy Wang      Admit Date: 2017      PT Recommendation and Plan    Visit Dx:    ICD-10-CM ICD-9-CM   1. Impaired functional mobility, balance, gait, and endurance Z74.09 V49.89             Outcome Measures       17 0900 17 1100 17 1000    How much help from another person do you currently need...    Turning from your back to your side while in flat bed without using bedrails? 4  -CW 3  -AE 4  -MS (r) KM (t) MS (c)    Moving from lying on back to sitting on the side of a flat bed without bedrails? 4  -CW 3  -AE 3  -MS (r) KM (t) MS (c)    Moving to and from a bed to a chair (including a wheelchair)? 4  -CW 3  -AE 3  -MS (r) KM (t) MS (c)    Standing up from a chair using your arms (e.g., wheelchair, bedside chair)? 4  -CW 3  -AE 3  -MS (r) KM (t) MS (c)    Climbing 3-5 steps with a railing? 2  -CW 2  -AE 2  -MS (r) KM (t) MS (c)    To walk in hospital room? 4  -CW 3  -AE 3  -MS (r) KM (t) MS (c)    AM-PAC 6 Clicks Score 22  -CW 17  -AE 18  -MS (r) KM (t)    Functional Assessment    Outcome Measure Options AM-PAC 6 Clicks Basic Mobility (PT)  -CW AM-PAC 6 Clicks Basic Mobility (PT)  -AE AM-PAC 6 Clicks Basic Mobility (PT)  -MS (r) KM (t) MS (c)      User Key  (r) = Recorded By, (t) = Taken By, (c) = Cosigned By    Initials Name Provider Type    AE Apolonia Doe, PTA Physical Therapy Assistant    MS Regina Larry, PT DPT Physical Therapist    CONSTANTIN Reina, PTA Physical Therapy Assistant    BIRGIT Samano, PT Student PT Student                PT Charges       17 0932          Time Calculation    Start Time 0750  -CW      Stop Time 0821  -CW      Time Calculation (min) 31 min  -CW      PT Received On  02/03/17  -CW      PT Goal Re-Cert Due Date 02/11/17  -CW      Time Calculation- PT    Total Timed Code Minutes- PT 31 minute(s)  -CW        User Key  (r) = Recorded By, (t) = Taken By, (c) = Cosigned By    Initials Name Provider Type    CW Sandy Reina, PTA Physical Therapy Assistant                  IP PT Goals       02/03/17 1614 02/01/17 1016       Transfer Training PT LTG    Transfer Training PT LTG, Date Established  02/01/17  -MS (r) KM (t) MS (c)     Transfer Training PT LTG, Time to Achieve  by discharge  -MS (r) KM (t) MS (c)     Transfer Training PT LTG, Activity Type  bed to chair /chair to bed;sit to stand/stand to sit  -MS (r) KM (t) MS (c)     Transfer Training PT LTG, [place] Level  conditional independence  -MS (r) KM (t) MS (c)     Transfer Training PT LTG, Assist Device  other (see comments)   Knee Scooter  -MS (r) KM (t) MS (c)     Transfer Training PT LTG, Additional Goal  Pt able to safely perform transfers maintaining NWB status on R LE.   -MS (r) KM (t) MS (c)     Transfer Training PT  LTG, Date Goal Reviewed 02/03/17  -      Transfer Training PT LTG, Outcome goal [facility]  -      Gait Training PT LTG    Gait Training Goal PT LTG, Date Established  02/01/17  -MS (r) KM (t) MS (c)     Gait Training Goal PT LTG, Time to Achieve  by discharge  -MS (r) KM (t) MS (c)     Gait Training Goal PT LTG, [place] Level  conditional independence  -MS (r) KM (t) MS (c)     Gait Training Goal PT LTG, Assist Device  walker, rolling  -MS (r) KM (t) MS (c)     Gait Training Goal PT LTG, Distance to Achieve  15ft  -MS (r) KM (t) MS (c)     Gait Training Goal PT LTG, Additional Goal  Pt able to maintain NWB on R LE while ambulating with the rolling walker for maneuvering in spaces the knee scooter is unable to be used.   -MS (r) KM (t) MS (c)     Gait Training Goal PT LTG, Date Goal Reviewed 02/03/17  -      Gait Training Goal PT LTG, Outcome goal met  -      Dynamic Standing Balance PT LTG     Dynamic Standing Balance PT LTG, Date Established  02/01/17  -MS (r) KM (t) MS (c)     Dynamic Standing Balance PT LTG, Time to Achieve  by discharge  -MS (r) KM (t) MS (c)     Dynamic Standing Balance PT LTG, [place] Level  conditional independence  -MS (r) KM (t) MS (c)     Dynamic Standing Balance PT LTG, Assist Device  assistive Device  -MS (r) KM (t) MS (c)     Dynamic Standing Balance PT LTG, Additional Goal  Pt able to maintain NWB on R LE while performing an UE reaching task with walker and knee scooter  -MS (r) KM (t) MS (c)     Dynamic Standing Balance PT LTG, Date Goal Reviewed 02/03/17  -      Dynamic Standing Balance PT LTG, Outcome goal not met  -      Dynamic Standing Balance PT LTG, Reason Goal Not Met discharged from facility  -        User Key  (r) = Recorded By, (t) = Taken By, (c) = Cosigned By    Initials Name Provider Type     Kae Guadarrama, CARLA Physical Therapy Assistant    MS Regina Larry, PT DPT Physical Therapist    KM Swapna Samano, PT Student PT Student              PT Discharge Summary  Reason for Discharge: Discharge from facility  Outcomes Achieved: Refer to plan of care for updates on goals achieved  Discharge Destination: Home with assist      Kae Guadarrama PTA   2/3/2017

## 2017-02-03 NOTE — PLAN OF CARE
Problem: Patient Care Overview (Adult)  Goal: Plan of Care Review  Outcome: Outcome(s) achieved Date Met:  02/03/17 02/03/17 0935   Coping/Psychosocial Response Interventions   Plan Of Care Reviewed With patient   Patient Care Overview   Progress progress toward functional goals as expected   Outcome Evaluation   Outcome Summary/Follow up Plan Pt is independent with all mobility and has met all goals set by PT.

## 2017-02-03 NOTE — PLAN OF CARE
Problem: Patient Care Overview (Adult)  Goal: Plan of Care Review  Outcome: Ongoing (interventions implemented as appropriate)    02/03/17 0331   Coping/Psychosocial Response Interventions   Plan Of Care Reviewed With patient   Patient Care Overview   Progress improving   Outcome Evaluation   Outcome Summary/Follow up Plan Selene states she is feeling much better than yesterday. Up ad alana to BR with walker. Still no BM, Colace started yesterday.        Goal: Adult Individualization and Mutuality  Outcome: Ongoing (interventions implemented as appropriate)  Goal: Discharge Needs Assessment  Outcome: Ongoing (interventions implemented as appropriate)    Problem: Perioperative Period (Adult)  Goal: Signs and Symptoms of Listed Potential Problems Will be Absent or Manageable (Perioperative Period)  Outcome: Ongoing (interventions implemented as appropriate)

## 2017-02-06 NOTE — PAYOR COMM NOTE
"[place] HOME 2-3-17    [id number]  Markie Sargent (41 y.o. Female)     Date of Birth Social Security Number Address Home Phone MRN    1975  [address]  [place] [address] [phone] [id number]    Jainism Marital Status          Quaker Single       Admission Date Admission Type Admitting Provider Attending Provider Department, Room/Bed    [facility]    Discharge Date Discharge Disposition Discharge Destination        2/3/2017 Home or Self Care             Attending Provider: (none)    Allergies:  Fish-derived Products, Amoxicillin    Isolation:  None   Infection:  None   Code Status:  Prior    Ht:  69\" (175.3 cm)   Wt:  262 lb 4 oz (119 kg)    Admission Cmt:  None   Principal Problem:  None                Active Insurance as of 1/31/2017     Primary Coverage     Payor Plan Insurance Group Employer/Plan Group    [place] [employer] [place] [id number] [place]      Payor Plan Address Payor Plan Phone Number Effective From Effective To    [address] [address]  1/1/2017     [place] [address]       Subscriber Name Subscriber Birth Date Member ID       MARKIE SARGENT 1975 [id number]                 Emergency Contacts      (Rel.) Home Phone Work Phone Mobile Phone    Elvis Larry (Significant Other) [phone] -- --    Aysha Angel (Mother) [phone] -- --               Discharge Summary      NATIVIDAD Cornelius at 2/3/2017 12:52 PM          [facility] Discharge Summary     Date of Discharge:  2/3/2017    Discharge Diagnosis: 3 days status post ankle and subtalar joint arthrodesis, right.  Patient resting in bed.  Pain controlled.  Comfortable with Discharge home today.      Presenting Problem/History of Present Illness  Post-traumatic arthritis of ankle, right [M19.171]     Hospital Course  Patient is a 41 y.o. female presented for right ankle surgery.  Patient admitted " post-operatively for pain control.  Pain controlled.  Patient doing well and ready for discharge home.      Procedures Performed  Procedure(s):  ANKLE AND SUBTALOR JOINT ARTHRODESIS, RIGHT ANKLE       Consults:   Consults     No orders found from 1/2/2017 to 2/1/2017.          Condition on Discharge:  Stable     Vital Signs  Temp:  [98.4 °F (36.9 °C)] 98.4 °F (36.9 °C)  Heart Rate:  [93-96] 93  Resp:  [16] 16  BP: (128-151)/(53-57) 151/53    Physical Exam:   Physical Exam  Patient alert and oriented ×3 no distress.     Respirations regular even unlabored.     Splint intact. Cap refill less than 3 seconds. Splint clean and dry.      Discharge Disposition  Home or Self Care    Discharge Medications   Vikash Sargent   Home Medication Instructions JANA:[id number]    Printed on:02/03/17 7974   Medication Information                      carisoprodol (SOMA) 350 MG tablet  Take 1 tablet by mouth Every 8 (Eight) Hours for 8 days.             enoxaparin (LOVENOX) 40 MG/0.4ML solution syringe  Inject 0.4 mL under the skin Every 12 (Twelve) Hours.             ondansetron (ZOFRAN) 4 MG tablet  Take 1 tablet by mouth Every 8 (Eight) Hours As Needed for nausea or vomiting.             oxyCODONE-acetaminophen (PERCOCET)  MG per tablet  Take 2 tablets by mouth Every 4 (Four) Hours for 40 doses.             rOPINIRole (REQUIP) 2 MG tablet  Take 2 mg by mouth Daily As Needed (for restless legs).                 Discharge Diet: regular as tolerated     Activity at Discharge:   Activity Instructions     Weight Bearing Status       Weight Bearing Status:  Non-Weight Bearing                 Follow-up Appointments  No future appointments.  Referrals and Follow-ups to Schedule     Apply Ice to Affected Ankle / Foot Every 2 Hours    As directed        Return to Clinic for Follow Up    As directed    Follow-up in 3-5 days with Dr. Wang                 Patient to be completely NWB to right lower extremity.    RX given for pain  medication, Lovenox, Soma, Zofran    Follow-up with Dr. Wang in 3-5 days.           NATIVIDAD Cornelius  02/03/17  12:52 PM     Electronically signed by NATIVIDAD Cornelius at 2/3/2017 12:59 PM

## 2017-02-15 ENCOUNTER — TRANSCRIBE ORDERS (OUTPATIENT)
Dept: ADMINISTRATIVE | Facility: HOSPITAL | Age: 42
End: 2017-02-15

## 2017-02-15 ENCOUNTER — APPOINTMENT (OUTPATIENT)
Dept: LAB | Facility: HOSPITAL | Age: 42
End: 2017-02-15
Attending: PODIATRIST

## 2017-02-15 DIAGNOSIS — Z98.1 ARTHRODESIS STATUS: ICD-10-CM

## 2017-02-15 DIAGNOSIS — Z47.89 ORTHOTIC TRAINING: Primary | ICD-10-CM

## 2017-02-15 DIAGNOSIS — Z47.89 ENCOUNTER FOR OTHER ORTHOPEDIC AFTERCARE: ICD-10-CM

## 2017-02-15 LAB
INR PPP: 0.92 (ref 0.91–1.09)
PROTHROMBIN TIME: 12.6 SECONDS (ref 11.9–14.6)

## 2017-02-15 PROCEDURE — 36415 COLL VENOUS BLD VENIPUNCTURE: CPT | Performed by: PODIATRIST

## 2017-02-15 PROCEDURE — 85610 PROTHROMBIN TIME: CPT | Performed by: PODIATRIST

## 2017-03-17 LAB
INR BLD: 1.6 (ref 0.88–1.18)
PROTHROMBIN TIME: 18.8 SEC (ref 12–14.6)

## 2017-03-25 ENCOUNTER — OFFICE VISIT (OUTPATIENT)
Dept: FAMILY MEDICINE CLINIC | Age: 42
End: 2017-03-25
Payer: MEDICAID

## 2017-03-25 VITALS
TEMPERATURE: 98.6 F | OXYGEN SATURATION: 98 % | DIASTOLIC BLOOD PRESSURE: 80 MMHG | WEIGHT: 259 LBS | SYSTOLIC BLOOD PRESSURE: 122 MMHG | HEART RATE: 88 BPM

## 2017-03-25 DIAGNOSIS — F17.219 CIGARETTE NICOTINE DEPENDENCE WITH NICOTINE-INDUCED DISORDER: Primary | ICD-10-CM

## 2017-03-25 PROCEDURE — 99214 OFFICE O/P EST MOD 30 MIN: CPT | Performed by: FAMILY MEDICINE

## 2017-03-25 RX ORDER — VARENICLINE TARTRATE 1 MG/1
1 TABLET, FILM COATED ORAL 2 TIMES DAILY
Qty: 60 TABLET | Refills: 5 | Status: SHIPPED | OUTPATIENT
Start: 2017-03-25 | End: 2017-04-20

## 2017-03-25 RX ORDER — VARENICLINE TARTRATE 25 MG
KIT ORAL
Qty: 1 EACH | Refills: 0 | Status: SHIPPED | OUTPATIENT
Start: 2017-03-25 | End: 2017-04-20

## 2017-03-25 RX ORDER — WARFARIN SODIUM 5 MG/1
5 TABLET ORAL
COMMUNITY
End: 2017-07-29

## 2017-03-25 RX ORDER — ERGOCALCIFEROL 1.25 MG/1
50000 CAPSULE ORAL WEEKLY
COMMUNITY

## 2017-03-25 ASSESSMENT — ENCOUNTER SYMPTOMS
ALLERGIC/IMMUNOLOGIC NEGATIVE: 1
EYES NEGATIVE: 1
RESPIRATORY NEGATIVE: 1
GASTROINTESTINAL NEGATIVE: 1

## 2017-03-29 ENCOUNTER — TELEPHONE (OUTPATIENT)
Dept: FAMILY MEDICINE CLINIC | Age: 42
End: 2017-03-29

## 2017-04-20 ENCOUNTER — OFFICE VISIT (OUTPATIENT)
Dept: FAMILY MEDICINE CLINIC | Age: 42
End: 2017-04-20
Payer: MEDICAID

## 2017-04-20 VITALS
TEMPERATURE: 98.5 F | HEIGHT: 71 IN | SYSTOLIC BLOOD PRESSURE: 118 MMHG | DIASTOLIC BLOOD PRESSURE: 78 MMHG | RESPIRATION RATE: 18 BRPM | WEIGHT: 254 LBS | HEART RATE: 114 BPM | OXYGEN SATURATION: 98 % | BODY MASS INDEX: 35.56 KG/M2

## 2017-04-20 DIAGNOSIS — G25.81 RESTLESS LEG SYNDROME: ICD-10-CM

## 2017-04-20 DIAGNOSIS — F17.210 CIGARETTE SMOKER: Primary | ICD-10-CM

## 2017-04-20 DIAGNOSIS — E66.09 OBESITY DUE TO EXCESS CALORIES, UNSPECIFIED OBESITY SEVERITY: ICD-10-CM

## 2017-04-20 LAB
ALBUMIN SERPL-MCNC: 4.1 G/DL (ref 3.5–5.2)
ALP BLD-CCNC: 83 U/L (ref 35–104)
ALT SERPL-CCNC: <5 U/L (ref 5–33)
ANION GAP SERPL CALCULATED.3IONS-SCNC: 16 MMOL/L (ref 7–19)
AST SERPL-CCNC: 20 U/L (ref 5–32)
BILIRUB SERPL-MCNC: 0.6 MG/DL (ref 0.2–1.2)
BUN BLDV-MCNC: 8 MG/DL (ref 6–20)
CALCIUM SERPL-MCNC: 9.6 MG/DL (ref 8.6–10)
CHLORIDE BLD-SCNC: 100 MMOL/L (ref 98–111)
CHOLESTEROL, TOTAL: 228 MG/DL (ref 160–199)
CO2: 24 MMOL/L (ref 22–29)
CREAT SERPL-MCNC: 0.8 MG/DL (ref 0.5–0.9)
GFR NON-AFRICAN AMERICAN: >60
GLOBULIN: 3.8 G/DL
GLUCOSE BLD-MCNC: 95 MG/DL (ref 74–109)
HCT VFR BLD CALC: 43 % (ref 37–47)
HDLC SERPL-MCNC: 48 MG/DL (ref 65–121)
HEMOGLOBIN: 13.9 G/DL (ref 12–16)
LDL CHOLESTEROL CALCULATED: 141 MG/DL
MCH RBC QN AUTO: 29.4 PG (ref 27–31)
MCHC RBC AUTO-ENTMCNC: 32.3 G/DL (ref 33–37)
MCV RBC AUTO: 91.1 FL (ref 81–99)
PDW BLD-RTO: 14.9 % (ref 11.5–14.5)
PLATELET # BLD: 352 K/UL (ref 130–400)
PMV BLD AUTO: 10.3 FL (ref 7.4–10.4)
POTASSIUM SERPL-SCNC: 4.8 MMOL/L (ref 3.5–5)
RBC # BLD: 4.72 M/UL (ref 4.2–5.4)
SODIUM BLD-SCNC: 140 MMOL/L (ref 136–145)
TOTAL PROTEIN: 7.9 G/DL (ref 6.6–8.7)
TRIGL SERPL-MCNC: 196 MG/DL (ref 150–199)
TSH SERPL DL<=0.05 MIU/L-ACNC: 1.1 UIU/ML (ref 0.27–4.2)
WBC # BLD: 9.4 K/UL (ref 4.8–10.8)

## 2017-04-20 PROCEDURE — 99214 OFFICE O/P EST MOD 30 MIN: CPT | Performed by: FAMILY MEDICINE

## 2017-04-20 RX ORDER — ROPINIROLE 1 MG/1
1 TABLET, FILM COATED ORAL NIGHTLY
Qty: 30 TABLET | Refills: 5 | Status: SHIPPED | OUTPATIENT
Start: 2017-04-20

## 2017-04-20 RX ORDER — BUPROPION HYDROCHLORIDE 150 MG/1
TABLET, EXTENDED RELEASE ORAL
Qty: 60 TABLET | Refills: 5 | Status: SHIPPED | OUTPATIENT
Start: 2017-04-20 | End: 2017-07-29

## 2017-04-20 RX ORDER — CALCIUM CARBONATE 500(1250)
500 TABLET ORAL DAILY
COMMUNITY
End: 2017-07-29

## 2017-04-20 ASSESSMENT — ENCOUNTER SYMPTOMS
GASTROINTESTINAL NEGATIVE: 1
ALLERGIC/IMMUNOLOGIC NEGATIVE: 1
RESPIRATORY NEGATIVE: 1
EYES NEGATIVE: 1

## 2017-05-05 ENCOUNTER — APPOINTMENT (OUTPATIENT)
Dept: PREADMISSION TESTING | Facility: HOSPITAL | Age: 42
End: 2017-05-05

## 2017-05-05 VITALS
RESPIRATION RATE: 20 BRPM | BODY MASS INDEX: 36.12 KG/M2 | OXYGEN SATURATION: 96 % | WEIGHT: 258 LBS | SYSTOLIC BLOOD PRESSURE: 124 MMHG | DIASTOLIC BLOOD PRESSURE: 88 MMHG | HEART RATE: 78 BPM | HEIGHT: 71 IN

## 2017-05-05 LAB
DEPRECATED RDW RBC AUTO: 49.6 FL (ref 40–54)
ERYTHROCYTE [DISTWIDTH] IN BLOOD BY AUTOMATED COUNT: 15.5 % (ref 12–15)
HCT VFR BLD AUTO: 37.1 % (ref 37–47)
HGB BLD-MCNC: 12.5 G/DL (ref 12–16)
MCH RBC QN AUTO: 29.8 PG (ref 28–32)
MCHC RBC AUTO-ENTMCNC: 33.7 G/DL (ref 33–36)
MCV RBC AUTO: 88.5 FL (ref 82–98)
PLATELET # BLD AUTO: 301 10*3/MM3 (ref 130–400)
PMV BLD AUTO: 10.2 FL (ref 6–12)
RBC # BLD AUTO: 4.19 10*6/MM3 (ref 4.2–5.4)
WBC NRBC COR # BLD: 8.66 10*3/MM3 (ref 4.8–10.8)

## 2017-05-05 PROCEDURE — 85027 COMPLETE CBC AUTOMATED: CPT | Performed by: PODIATRIST

## 2017-05-05 RX ORDER — BUPROPION HYDROCHLORIDE 300 MG/1
300 TABLET ORAL EVERY MORNING
COMMUNITY

## 2017-05-05 RX ORDER — OMEGA-3S/DHA/EPA/FISH OIL 1000-1400
1 CAPSULE,DELAYED RELEASE (ENTERIC COATED) ORAL DAILY
COMMUNITY

## 2017-05-05 RX ORDER — ERGOCALCIFEROL 1.25 MG/1
50000 CAPSULE ORAL
COMMUNITY

## 2017-05-05 RX ORDER — UBIDECARENONE 75 MG
200 CAPSULE ORAL DAILY
COMMUNITY

## 2017-05-09 ENCOUNTER — HOSPITAL ENCOUNTER (OUTPATIENT)
Facility: HOSPITAL | Age: 42
Setting detail: HOSPITAL OUTPATIENT SURGERY
Discharge: HOME | End: 2017-05-09
Attending: PODIATRIST | Admitting: PODIATRIST

## 2017-05-09 ENCOUNTER — ANESTHESIA (OUTPATIENT)
Dept: PERIOP | Facility: HOSPITAL | Age: 42
End: 2017-05-09

## 2017-05-09 ENCOUNTER — APPOINTMENT (OUTPATIENT)
Dept: GENERAL RADIOLOGY | Facility: HOSPITAL | Age: 42
End: 2017-05-09

## 2017-05-09 ENCOUNTER — ANESTHESIA EVENT (OUTPATIENT)
Dept: PERIOP | Facility: HOSPITAL | Age: 42
End: 2017-05-09

## 2017-05-09 VITALS
SYSTOLIC BLOOD PRESSURE: 105 MMHG | HEART RATE: 76 BPM | TEMPERATURE: 98.9 F | DIASTOLIC BLOOD PRESSURE: 93 MMHG | OXYGEN SATURATION: 96 % | RESPIRATION RATE: 16 BRPM

## 2017-05-09 PROCEDURE — 73600 X-RAY EXAM OF ANKLE: CPT

## 2017-05-09 PROCEDURE — 25010000002 PROPOFOL 10 MG/ML EMULSION: Performed by: NURSE ANESTHETIST, CERTIFIED REGISTERED

## 2017-05-09 PROCEDURE — 25010000002 FENTANYL CITRATE (PF) 100 MCG/2ML SOLUTION: Performed by: NURSE ANESTHETIST, CERTIFIED REGISTERED

## 2017-05-09 PROCEDURE — 25010000002 DEXAMETHASONE PER 1 MG: Performed by: NURSE ANESTHETIST, CERTIFIED REGISTERED

## 2017-05-09 PROCEDURE — 25010000003 CEFAZOLIN PER 500 MG: Performed by: PODIATRIST

## 2017-05-09 PROCEDURE — 25010000002 ROPIVACAINE PER 1 MG: Performed by: PODIATRIST

## 2017-05-09 PROCEDURE — 25010000002 ONDANSETRON PER 1 MG: Performed by: NURSE ANESTHETIST, CERTIFIED REGISTERED

## 2017-05-09 PROCEDURE — 76000 FLUOROSCOPY <1 HR PHYS/QHP: CPT

## 2017-05-09 PROCEDURE — 25010000002 MIDAZOLAM PER 1 MG: Performed by: ANESTHESIOLOGY

## 2017-05-09 RX ORDER — FENTANYL CITRATE 50 UG/ML
INJECTION, SOLUTION INTRAMUSCULAR; INTRAVENOUS AS NEEDED
Status: DISCONTINUED | OUTPATIENT
Start: 2017-05-09 | End: 2017-05-09 | Stop reason: SURG

## 2017-05-09 RX ORDER — ROPIVACAINE HYDROCHLORIDE 5 MG/ML
INJECTION, SOLUTION EPIDURAL; INFILTRATION; PERINEURAL AS NEEDED
Status: DISCONTINUED | OUTPATIENT
Start: 2017-05-09 | End: 2017-05-09 | Stop reason: HOSPADM

## 2017-05-09 RX ORDER — DIPHENHYDRAMINE HYDROCHLORIDE 50 MG/ML
12.5 INJECTION INTRAMUSCULAR; INTRAVENOUS
Status: DISCONTINUED | OUTPATIENT
Start: 2017-05-09 | End: 2017-05-09 | Stop reason: HOSPADM

## 2017-05-09 RX ORDER — ONDANSETRON 2 MG/ML
4 INJECTION INTRAMUSCULAR; INTRAVENOUS ONCE AS NEEDED
Status: DISCONTINUED | OUTPATIENT
Start: 2017-05-09 | End: 2017-05-09 | Stop reason: HOSPADM

## 2017-05-09 RX ORDER — IPRATROPIUM BROMIDE AND ALBUTEROL SULFATE 2.5; .5 MG/3ML; MG/3ML
3 SOLUTION RESPIRATORY (INHALATION) ONCE AS NEEDED
Status: DISCONTINUED | OUTPATIENT
Start: 2017-05-09 | End: 2017-05-09 | Stop reason: HOSPADM

## 2017-05-09 RX ORDER — LIDOCAINE HYDROCHLORIDE 20 MG/ML
INJECTION, SOLUTION INFILTRATION; PERINEURAL AS NEEDED
Status: DISCONTINUED | OUTPATIENT
Start: 2017-05-09 | End: 2017-05-09 | Stop reason: SURG

## 2017-05-09 RX ORDER — HYDRALAZINE HYDROCHLORIDE 20 MG/ML
5 INJECTION INTRAMUSCULAR; INTRAVENOUS
Status: DISCONTINUED | OUTPATIENT
Start: 2017-05-09 | End: 2017-05-09 | Stop reason: HOSPADM

## 2017-05-09 RX ORDER — OXYCODONE AND ACETAMINOPHEN 10; 325 MG/1; MG/1
1 TABLET ORAL EVERY 4 HOURS PRN
Qty: 60 TABLET | Refills: 0 | Status: SHIPPED | OUTPATIENT
Start: 2017-05-09

## 2017-05-09 RX ORDER — NALOXONE HCL 0.4 MG/ML
0.4 VIAL (ML) INJECTION AS NEEDED
Status: DISCONTINUED | OUTPATIENT
Start: 2017-05-09 | End: 2017-05-09 | Stop reason: HOSPADM

## 2017-05-09 RX ORDER — MIDAZOLAM HYDROCHLORIDE 1 MG/ML
1 INJECTION INTRAMUSCULAR; INTRAVENOUS
Status: DISCONTINUED | OUTPATIENT
Start: 2017-05-09 | End: 2017-05-09 | Stop reason: HOSPADM

## 2017-05-09 RX ORDER — SODIUM CHLORIDE, SODIUM LACTATE, POTASSIUM CHLORIDE, CALCIUM CHLORIDE 600; 310; 30; 20 MG/100ML; MG/100ML; MG/100ML; MG/100ML
100 INJECTION, SOLUTION INTRAVENOUS CONTINUOUS
Status: DISCONTINUED | OUTPATIENT
Start: 2017-05-09 | End: 2017-05-09 | Stop reason: HOSPADM

## 2017-05-09 RX ORDER — OXYCODONE AND ACETAMINOPHEN 7.5; 325 MG/1; MG/1
1 TABLET ORAL ONCE AS NEEDED
Status: DISCONTINUED | OUTPATIENT
Start: 2017-05-09 | End: 2017-05-09 | Stop reason: HOSPADM

## 2017-05-09 RX ORDER — SCOLOPAMINE TRANSDERMAL SYSTEM 1 MG/1
1 PATCH, EXTENDED RELEASE TRANSDERMAL ONCE
Status: DISCONTINUED | OUTPATIENT
Start: 2017-05-09 | End: 2017-05-09 | Stop reason: HOSPADM

## 2017-05-09 RX ORDER — ONDANSETRON 4 MG/1
4 TABLET, FILM COATED ORAL EVERY 4 HOURS
Qty: 60 TABLET | Refills: 0 | Status: SHIPPED | OUTPATIENT
Start: 2017-05-09

## 2017-05-09 RX ORDER — MIDAZOLAM HYDROCHLORIDE 1 MG/ML
2 INJECTION INTRAMUSCULAR; INTRAVENOUS
Status: DISCONTINUED | OUTPATIENT
Start: 2017-05-09 | End: 2017-05-09 | Stop reason: HOSPADM

## 2017-05-09 RX ORDER — MORPHINE SULFATE 2 MG/ML
2 INJECTION, SOLUTION INTRAMUSCULAR; INTRAVENOUS
Status: DISCONTINUED | OUTPATIENT
Start: 2017-05-09 | End: 2017-05-09 | Stop reason: HOSPADM

## 2017-05-09 RX ORDER — ONDANSETRON 2 MG/ML
INJECTION INTRAMUSCULAR; INTRAVENOUS AS NEEDED
Status: DISCONTINUED | OUTPATIENT
Start: 2017-05-09 | End: 2017-05-09 | Stop reason: SURG

## 2017-05-09 RX ORDER — SODIUM CHLORIDE 0.9 % (FLUSH) 0.9 %
1-10 SYRINGE (ML) INJECTION AS NEEDED
Status: DISCONTINUED | OUTPATIENT
Start: 2017-05-09 | End: 2017-05-09 | Stop reason: HOSPADM

## 2017-05-09 RX ORDER — PROPOFOL 10 MG/ML
VIAL (ML) INTRAVENOUS AS NEEDED
Status: DISCONTINUED | OUTPATIENT
Start: 2017-05-09 | End: 2017-05-09 | Stop reason: SURG

## 2017-05-09 RX ORDER — DEXAMETHASONE SODIUM PHOSPHATE 4 MG/ML
INJECTION, SOLUTION INTRA-ARTICULAR; INTRALESIONAL; INTRAMUSCULAR; INTRAVENOUS; SOFT TISSUE AS NEEDED
Status: DISCONTINUED | OUTPATIENT
Start: 2017-05-09 | End: 2017-05-09 | Stop reason: SURG

## 2017-05-09 RX ORDER — MEPERIDINE HYDROCHLORIDE 25 MG/ML
12.5 INJECTION INTRAMUSCULAR; INTRAVENOUS; SUBCUTANEOUS
Status: DISCONTINUED | OUTPATIENT
Start: 2017-05-09 | End: 2017-05-09 | Stop reason: HOSPADM

## 2017-05-09 RX ORDER — LABETALOL HYDROCHLORIDE 5 MG/ML
5 INJECTION, SOLUTION INTRAVENOUS
Status: DISCONTINUED | OUTPATIENT
Start: 2017-05-09 | End: 2017-05-09 | Stop reason: HOSPADM

## 2017-05-09 RX ADMIN — SODIUM CHLORIDE, POTASSIUM CHLORIDE, SODIUM LACTATE AND CALCIUM CHLORIDE 100 ML/HR: 600; 310; 30; 20 INJECTION, SOLUTION INTRAVENOUS at 11:06

## 2017-05-09 RX ADMIN — CEFAZOLIN 2 G: 1 INJECTION, POWDER, FOR SOLUTION INTRAVENOUS at 11:52

## 2017-05-09 RX ADMIN — FENTANYL CITRATE 50 MCG: 50 INJECTION INTRAMUSCULAR; INTRAVENOUS at 12:01

## 2017-05-09 RX ADMIN — MIDAZOLAM HYDROCHLORIDE 2 MG: 1 INJECTION, SOLUTION INTRAMUSCULAR; INTRAVENOUS at 11:06

## 2017-05-09 RX ADMIN — LIDOCAINE HYDROCHLORIDE 50 MG: 20 INJECTION, SOLUTION INFILTRATION; PERINEURAL at 11:52

## 2017-05-09 RX ADMIN — PROPOFOL 200 MG: 10 INJECTION, EMULSION INTRAVENOUS at 11:52

## 2017-05-09 RX ADMIN — FENTANYL CITRATE 50 MCG: 50 INJECTION INTRAMUSCULAR; INTRAVENOUS at 12:05

## 2017-05-09 RX ADMIN — DEXAMETHASONE SODIUM PHOSPHATE 4 MG: 4 INJECTION, SOLUTION INTRAMUSCULAR; INTRAVENOUS at 12:07

## 2017-05-09 RX ADMIN — SCOPALAMINE 1 PATCH: 1 PATCH, EXTENDED RELEASE TRANSDERMAL at 11:06

## 2017-05-09 RX ADMIN — FENTANYL CITRATE 50 MCG: 50 INJECTION INTRAMUSCULAR; INTRAVENOUS at 12:16

## 2017-05-09 RX ADMIN — FENTANYL CITRATE 100 MCG: 50 INJECTION INTRAMUSCULAR; INTRAVENOUS at 11:52

## 2017-05-09 RX ADMIN — ONDANSETRON HYDROCHLORIDE 4 MG: 2 SOLUTION INTRAMUSCULAR; INTRAVENOUS at 12:07

## 2017-07-29 ENCOUNTER — HOSPITAL ENCOUNTER (EMERGENCY)
Age: 42
Discharge: HOME | End: 2017-07-29
Payer: MEDICAID

## 2017-07-29 VITALS
TEMPERATURE: 98.2 F | RESPIRATION RATE: 18 BRPM | SYSTOLIC BLOOD PRESSURE: 130 MMHG | HEART RATE: 66 BPM | HEIGHT: 63 IN | DIASTOLIC BLOOD PRESSURE: 83 MMHG | OXYGEN SATURATION: 95 %

## 2017-07-29 DIAGNOSIS — R21 RASH AND OTHER NONSPECIFIC SKIN ERUPTION: Primary | ICD-10-CM

## 2017-07-29 PROCEDURE — 99282 EMERGENCY DEPT VISIT SF MDM: CPT

## 2017-07-29 PROCEDURE — 99282 EMERGENCY DEPT VISIT SF MDM: CPT | Performed by: NURSE PRACTITIONER

## 2017-07-29 RX ORDER — CLOTRIMAZOLE AND BETAMETHASONE DIPROPIONATE 10; .64 MG/G; MG/G
CREAM TOPICAL
Qty: 45 G | Refills: 0 | Status: SHIPPED | OUTPATIENT
Start: 2017-07-29

## 2017-07-29 RX ORDER — PRAZOSIN HYDROCHLORIDE 1 MG/1
1 CAPSULE ORAL NIGHTLY
COMMUNITY

## 2017-07-29 RX ORDER — LAMOTRIGINE 25 MG/1
25 TABLET ORAL DAILY
COMMUNITY
End: 2017-07-29

## 2017-07-29 ASSESSMENT — ENCOUNTER SYMPTOMS: RESPIRATORY NEGATIVE: 1

## 2017-08-04 ENCOUNTER — APPOINTMENT (OUTPATIENT)
Dept: LAB | Facility: HOSPITAL | Age: 42
End: 2017-08-04

## 2017-08-04 ENCOUNTER — TRANSCRIBE ORDERS (OUTPATIENT)
Dept: ADMINISTRATIVE | Facility: HOSPITAL | Age: 42
End: 2017-08-04

## 2017-08-04 DIAGNOSIS — Z00.01 ENCOUNTER FOR WELL ADULT EXAM WITH ABNORMAL FINDINGS: Primary | ICD-10-CM

## 2017-08-04 LAB
ALBUMIN SERPL-MCNC: 3.9 G/DL (ref 3.5–5)
ALBUMIN/GLOB SERPL: 1.3 G/DL (ref 1.1–2.5)
ALP SERPL-CCNC: 78 U/L (ref 24–120)
ALT SERPL W P-5'-P-CCNC: 34 U/L (ref 0–54)
ANION GAP SERPL CALCULATED.3IONS-SCNC: 9 MMOL/L (ref 4–13)
AST SERPL-CCNC: 19 U/L (ref 7–45)
BASOPHILS # BLD AUTO: 0.03 10*3/MM3 (ref 0–0.2)
BASOPHILS NFR BLD AUTO: 0.4 % (ref 0–2)
BILIRUB SERPL-MCNC: 0.8 MG/DL (ref 0.1–1)
BUN BLD-MCNC: 6 MG/DL (ref 5–21)
BUN/CREAT SERPL: 7.1 (ref 7–25)
CALCIUM SPEC-SCNC: 9.1 MG/DL (ref 8.4–10.4)
CHLORIDE SERPL-SCNC: 107 MMOL/L (ref 98–110)
CO2 SERPL-SCNC: 25 MMOL/L (ref 24–31)
CREAT BLD-MCNC: 0.84 MG/DL (ref 0.5–1.4)
DEPRECATED RDW RBC AUTO: 46.9 FL (ref 40–54)
EOSINOPHIL # BLD AUTO: 0.13 10*3/MM3 (ref 0–0.7)
EOSINOPHIL NFR BLD AUTO: 1.6 % (ref 0–4)
ERYTHROCYTE [DISTWIDTH] IN BLOOD BY AUTOMATED COUNT: 14 % (ref 12–15)
GFR SERPL CREATININE-BSD FRML MDRD: 75 ML/MIN/1.73
GLOBULIN UR ELPH-MCNC: 3.1 GM/DL
GLUCOSE BLD-MCNC: 99 MG/DL (ref 70–100)
HBA1C MFR BLD: 5.5 %
HCT VFR BLD AUTO: 39.2 % (ref 37–47)
HGB BLD-MCNC: 13.2 G/DL (ref 12–16)
IMM GRANULOCYTES # BLD: 0.01 10*3/MM3 (ref 0–0.03)
IMM GRANULOCYTES NFR BLD: 0.1 % (ref 0–5)
LYMPHOCYTES # BLD AUTO: 2.71 10*3/MM3 (ref 0.72–4.86)
LYMPHOCYTES NFR BLD AUTO: 32.4 % (ref 15–45)
MCH RBC QN AUTO: 30.8 PG (ref 28–32)
MCHC RBC AUTO-ENTMCNC: 33.7 G/DL (ref 33–36)
MCV RBC AUTO: 91.6 FL (ref 82–98)
MONOCYTES # BLD AUTO: 0.67 10*3/MM3 (ref 0.19–1.3)
MONOCYTES NFR BLD AUTO: 8 % (ref 4–12)
NEUTROPHILS # BLD AUTO: 4.81 10*3/MM3 (ref 1.87–8.4)
NEUTROPHILS NFR BLD AUTO: 57.5 % (ref 39–78)
PLATELET # BLD AUTO: 284 10*3/MM3 (ref 130–400)
PMV BLD AUTO: 10.9 FL (ref 6–12)
POTASSIUM BLD-SCNC: 3.7 MMOL/L (ref 3.5–5.3)
PROT SERPL-MCNC: 7 G/DL (ref 6.3–8.7)
RBC # BLD AUTO: 4.28 10*6/MM3 (ref 4.2–5.4)
SODIUM BLD-SCNC: 141 MMOL/L (ref 135–145)
T4 FREE SERPL-MCNC: 1.17 NG/DL (ref 0.78–2.19)
TSH SERPL DL<=0.05 MIU/L-ACNC: 0.89 MIU/ML (ref 0.47–4.68)
WBC NRBC COR # BLD: 8.36 10*3/MM3 (ref 4.8–10.8)

## 2017-08-04 PROCEDURE — 36415 COLL VENOUS BLD VENIPUNCTURE: CPT

## 2017-08-04 PROCEDURE — 84439 ASSAY OF FREE THYROXINE: CPT | Performed by: FAMILY MEDICINE

## 2017-08-04 PROCEDURE — 80053 COMPREHEN METABOLIC PANEL: CPT | Performed by: FAMILY MEDICINE

## 2017-08-04 PROCEDURE — 84443 ASSAY THYROID STIM HORMONE: CPT | Performed by: FAMILY MEDICINE

## 2017-08-04 PROCEDURE — 83036 HEMOGLOBIN GLYCOSYLATED A1C: CPT | Performed by: FAMILY MEDICINE

## 2017-08-04 PROCEDURE — 85025 COMPLETE CBC W/AUTO DIFF WBC: CPT | Performed by: FAMILY MEDICINE

## (undated) DEVICE — PK EXTRM 30

## (undated) DEVICE — GOWN,NON-REINFORCED,SIRUS,SET IN SLV,XL: Brand: MEDLINE

## (undated) DEVICE — ANTIBACTERIAL UNDYED BRAIDED (POLYGLACTIN 910), SYNTHETIC ABSORBABLE SUTURE: Brand: COATED VICRYL

## (undated) DEVICE — DRP C/ARMOR

## (undated) DEVICE — SUT ETHLN 3/0 FS1 30IN 669H

## (undated) DEVICE — PK TURNOVER RM ADV

## (undated) DEVICE — BNDG ELAS CO-FLEX SLF ADHR 6IN 5YD LF STRL

## (undated) DEVICE — APPL DURAPREP IODOPHOR APL 26ML

## (undated) DEVICE — DRSNG GZ CURAD XEROFORM NONADHS 5X9IN STRL

## (undated) DEVICE — NAIL DRILL: Brand: VALOR

## (undated) DEVICE — TOTAL TRAY, URNMTR, SILV, LF, 16FR 10ML: Brand: MEDLINE

## (undated) DEVICE — TRY SKINPREP WET CHG 4PCT SPNG HIB

## (undated) DEVICE — GLV SURG TRIUMPH ORTHO W/ALOE PF LTX 8 STRL

## (undated) DEVICE — SYR LL TP 10ML STRL

## (undated) DEVICE — BNDG CURAD ADHS 4IN WHT

## (undated) DEVICE — UNDERCAST PADDING: Brand: DEROYAL

## (undated) DEVICE — BNDG ESMARK 4IN 9FT LF STRL BLU

## (undated) DEVICE — BANDAGE,GAUZE,BULKEE II,4.5"X4.1YD,STRL: Brand: MEDLINE

## (undated) DEVICE — HANDPIECE SET WITH HIGH FLOW TIP AND SUCTION TUBE: Brand: INTERPULSE

## (undated) DEVICE — PRECISION THIN (9.0 X 0.38 X 25.0MM)

## (undated) DEVICE — WIPE MEROCEL 3.625X3IN

## (undated) DEVICE — CVR UNIV C/ARM

## (undated) DEVICE — DISPOSABLE TOURNIQUET CUFF SINGLE BLADDER, SINGLE PORT AND QUICK CONNECT CONNECTOR: Brand: COLOR CUFF

## (undated) DEVICE — NAIL K-WIRE
Type: IMPLANTABLE DEVICE | Status: NON-FUNCTIONAL
Brand: VALOR
Removed: 2017-01-31

## (undated) DEVICE — INTENDED FOR TISSUE SEPARATION, AND OTHER PROCEDURES THAT REQUIRE A SHARP SURGICAL BLADE TO PUNCTURE OR CUT.: Brand: BARD-PARKER ® STAINLESS STEEL BLADES

## (undated) DEVICE — GLV SURG TRIUMPH MICRO PF LTX 7.5 STRL

## (undated) DEVICE — NDL HYPO PRECISIONGLIDE REG 22G 1 1/2

## (undated) DEVICE — 4.0MM EGG BUR

## (undated) DEVICE — SPNG GZ WOVN 4X4IN 12PLY 10/BX STRL

## (undated) DEVICE — SYR LUERLOK 3CC 23G 1IN

## (undated) DEVICE — PAD GRND REM POLYHESIVE A/ DISP

## (undated) DEVICE — BNDG ELAS W/CLIP 6IN 10YD LF STRL

## (undated) DEVICE — NAIL BEADED GUIDE WIRE
Type: IMPLANTABLE DEVICE | Status: NON-FUNCTIONAL
Brand: VALOR
Removed: 2017-01-31

## (undated) DEVICE — CVR BRD ARM 13X30

## (undated) DEVICE — 4-PORT MANIFOLD: Brand: NEPTUNE 2